# Patient Record
Sex: MALE | Race: WHITE | Employment: PART TIME | URBAN - METROPOLITAN AREA
[De-identification: names, ages, dates, MRNs, and addresses within clinical notes are randomized per-mention and may not be internally consistent; named-entity substitution may affect disease eponyms.]

---

## 2017-10-13 ENCOUNTER — APPOINTMENT (OUTPATIENT)
Dept: GENERAL RADIOLOGY | Age: 47
DRG: 603 | End: 2017-10-13
Attending: EMERGENCY MEDICINE
Payer: MEDICARE

## 2017-10-13 ENCOUNTER — HOSPITAL ENCOUNTER (INPATIENT)
Age: 47
LOS: 2 days | Discharge: HOME | DRG: 603 | End: 2017-10-15
Attending: EMERGENCY MEDICINE | Admitting: FAMILY MEDICINE
Payer: MEDICARE

## 2017-10-13 DIAGNOSIS — L03.116 CELLULITIS OF LEFT LEG: Primary | ICD-10-CM

## 2017-10-13 DIAGNOSIS — E66.01 MORBID OBESITY (HCC): ICD-10-CM

## 2017-10-13 PROBLEM — R73.9 HYPERGLYCEMIA: Status: ACTIVE | Noted: 2017-10-13

## 2017-10-13 PROBLEM — R11.2 NAUSEA AND VOMITING: Status: ACTIVE | Noted: 2017-10-13

## 2017-10-13 PROBLEM — E11.9 DM (DIABETES MELLITUS) (HCC): Status: ACTIVE | Noted: 2017-10-13

## 2017-10-13 LAB
ALBUMIN SERPL-MCNC: 3.8 G/DL (ref 3.4–5)
ALBUMIN/GLOB SERPL: 1 {RATIO} (ref 0.8–1.7)
ALP SERPL-CCNC: 141 U/L (ref 45–117)
ALT SERPL-CCNC: 36 U/L (ref 16–61)
ANION GAP SERPL CALC-SCNC: 11 MMOL/L (ref 3–18)
ANION GAP SERPL CALC-SCNC: 8 MMOL/L (ref 3–18)
APPEARANCE UR: CLEAR
AST SERPL-CCNC: 16 U/L (ref 15–37)
ATRIAL RATE: 90 BPM
BACTERIA URNS QL MICRO: ABNORMAL /HPF
BASOPHILS # BLD: 0 K/UL (ref 0–0.06)
BASOPHILS # BLD: 0 K/UL (ref 0–0.06)
BASOPHILS NFR BLD: 0 % (ref 0–2)
BASOPHILS NFR BLD: 0 % (ref 0–2)
BILIRUB SERPL-MCNC: 1.4 MG/DL (ref 0.2–1)
BILIRUB UR QL: NEGATIVE
BUN SERPL-MCNC: 12 MG/DL (ref 7–18)
BUN SERPL-MCNC: 13 MG/DL (ref 7–18)
BUN/CREAT SERPL: 11 (ref 12–20)
BUN/CREAT SERPL: 13 (ref 12–20)
CALCIUM SERPL-MCNC: 8.9 MG/DL (ref 8.5–10.1)
CALCIUM SERPL-MCNC: 9 MG/DL (ref 8.5–10.1)
CALCULATED P AXIS, ECG09: 50 DEGREES
CALCULATED R AXIS, ECG10: -9 DEGREES
CALCULATED T AXIS, ECG11: 34 DEGREES
CHLORIDE SERPL-SCNC: 100 MMOL/L (ref 100–108)
CHLORIDE SERPL-SCNC: 97 MMOL/L (ref 100–108)
CO2 SERPL-SCNC: 26 MMOL/L (ref 21–32)
CO2 SERPL-SCNC: 28 MMOL/L (ref 21–32)
COLOR UR: YELLOW
CREAT SERPL-MCNC: 1.04 MG/DL (ref 0.6–1.3)
CREAT SERPL-MCNC: 1.1 MG/DL (ref 0.6–1.3)
DIAGNOSIS, 93000: NORMAL
DIFFERENTIAL METHOD BLD: ABNORMAL
DIFFERENTIAL METHOD BLD: ABNORMAL
EOSINOPHIL # BLD: 0.5 K/UL (ref 0–0.4)
EOSINOPHIL # BLD: 0.7 K/UL (ref 0–0.4)
EOSINOPHIL NFR BLD: 2 % (ref 0–5)
EOSINOPHIL NFR BLD: 3 % (ref 0–5)
EPITH CASTS URNS QL MICRO: ABNORMAL /LPF (ref 0–5)
ERYTHROCYTE [DISTWIDTH] IN BLOOD BY AUTOMATED COUNT: 12.4 % (ref 11.6–14.5)
ERYTHROCYTE [DISTWIDTH] IN BLOOD BY AUTOMATED COUNT: 12.5 % (ref 11.6–14.5)
EST. AVERAGE GLUCOSE BLD GHB EST-MCNC: 306 MG/DL
GLOBULIN SER CALC-MCNC: 3.9 G/DL (ref 2–4)
GLUCOSE BLD STRIP.AUTO-MCNC: 255 MG/DL (ref 70–110)
GLUCOSE BLD STRIP.AUTO-MCNC: 261 MG/DL (ref 70–110)
GLUCOSE BLD STRIP.AUTO-MCNC: 265 MG/DL (ref 70–110)
GLUCOSE BLD STRIP.AUTO-MCNC: 273 MG/DL (ref 70–110)
GLUCOSE SERPL-MCNC: 286 MG/DL (ref 74–99)
GLUCOSE SERPL-MCNC: 355 MG/DL (ref 74–99)
GLUCOSE UR STRIP.AUTO-MCNC: >1000 MG/DL
HBA1C MFR BLD: 12.3 % (ref 4.2–5.6)
HCT VFR BLD AUTO: 44.2 % (ref 36–48)
HCT VFR BLD AUTO: 46.9 % (ref 36–48)
HGB BLD-MCNC: 15 G/DL (ref 13–16)
HGB BLD-MCNC: 16.2 G/DL (ref 13–16)
HGB UR QL STRIP: NEGATIVE
KETONES UR QL STRIP.AUTO: ABNORMAL MG/DL
LACTATE BLD-SCNC: 1.4 MMOL/L (ref 0.4–2)
LACTATE BLD-SCNC: 2.7 MMOL/L (ref 0.4–2)
LEUKOCYTE ESTERASE UR QL STRIP.AUTO: ABNORMAL
LIPASE SERPL-CCNC: 188 U/L (ref 73–393)
LYMPHOCYTES # BLD: 0.9 K/UL (ref 0.9–3.6)
LYMPHOCYTES # BLD: 1.4 K/UL (ref 0.9–3.6)
LYMPHOCYTES NFR BLD: 4 % (ref 21–52)
LYMPHOCYTES NFR BLD: 7 % (ref 21–52)
MCH RBC QN AUTO: 30.7 PG (ref 24–34)
MCH RBC QN AUTO: 30.9 PG (ref 24–34)
MCHC RBC AUTO-ENTMCNC: 33.9 G/DL (ref 31–37)
MCHC RBC AUTO-ENTMCNC: 34.5 G/DL (ref 31–37)
MCV RBC AUTO: 89.5 FL (ref 74–97)
MCV RBC AUTO: 90.6 FL (ref 74–97)
MONOCYTES # BLD: 1.5 K/UL (ref 0.05–1.2)
MONOCYTES # BLD: 1.5 K/UL (ref 0.05–1.2)
MONOCYTES NFR BLD: 7 % (ref 3–10)
MONOCYTES NFR BLD: 7 % (ref 3–10)
NEUTS SEG # BLD: 18.2 K/UL (ref 1.8–8)
NEUTS SEG # BLD: 19.3 K/UL (ref 1.8–8)
NEUTS SEG NFR BLD: 84 % (ref 40–73)
NEUTS SEG NFR BLD: 86 % (ref 40–73)
NITRITE UR QL STRIP.AUTO: NEGATIVE
P-R INTERVAL, ECG05: 170 MS
PH UR STRIP: 6.5 [PH] (ref 5–8)
PLATELET # BLD AUTO: 229 K/UL (ref 135–420)
PLATELET # BLD AUTO: 245 K/UL (ref 135–420)
PMV BLD AUTO: 10.2 FL (ref 9.2–11.8)
PMV BLD AUTO: 10.3 FL (ref 9.2–11.8)
POTASSIUM SERPL-SCNC: 4.2 MMOL/L (ref 3.5–5.5)
POTASSIUM SERPL-SCNC: 4.3 MMOL/L (ref 3.5–5.5)
PROT SERPL-MCNC: 7.7 G/DL (ref 6.4–8.2)
PROT UR STRIP-MCNC: 30 MG/DL
Q-T INTERVAL, ECG07: 370 MS
QRS DURATION, ECG06: 96 MS
QTC CALCULATION (BEZET), ECG08: 452 MS
RBC # BLD AUTO: 4.88 M/UL (ref 4.7–5.5)
RBC # BLD AUTO: 5.24 M/UL (ref 4.7–5.5)
RBC #/AREA URNS HPF: NEGATIVE /HPF (ref 0–5)
SODIUM SERPL-SCNC: 134 MMOL/L (ref 136–145)
SODIUM SERPL-SCNC: 136 MMOL/L (ref 136–145)
SP GR UR REFRACTOMETRY: >1.03 (ref 1–1.03)
UROBILINOGEN UR QL STRIP.AUTO: 1 EU/DL (ref 0.2–1)
VENTRICULAR RATE, ECG03: 90 BPM
WBC # BLD AUTO: 21.7 K/UL (ref 4.6–13.2)
WBC # BLD AUTO: 22.4 K/UL (ref 4.6–13.2)
WBC URNS QL MICRO: ABNORMAL /HPF (ref 0–4)
YEAST URNS QL MICRO: ABNORMAL

## 2017-10-13 PROCEDURE — 74000 XR ABD (KUB): CPT

## 2017-10-13 PROCEDURE — 96365 THER/PROPH/DIAG IV INF INIT: CPT

## 2017-10-13 PROCEDURE — 74011250636 HC RX REV CODE- 250/636: Performed by: FAMILY MEDICINE

## 2017-10-13 PROCEDURE — 36415 COLL VENOUS BLD VENIPUNCTURE: CPT | Performed by: FAMILY MEDICINE

## 2017-10-13 PROCEDURE — 99285 EMERGENCY DEPT VISIT HI MDM: CPT

## 2017-10-13 PROCEDURE — 65270000029 HC RM PRIVATE

## 2017-10-13 PROCEDURE — 71010 XR CHEST PORT: CPT

## 2017-10-13 PROCEDURE — 81001 URINALYSIS AUTO W/SCOPE: CPT | Performed by: EMERGENCY MEDICINE

## 2017-10-13 PROCEDURE — 74011636637 HC RX REV CODE- 636/637: Performed by: HOSPITALIST

## 2017-10-13 PROCEDURE — 74011250636 HC RX REV CODE- 250/636: Performed by: HOSPITALIST

## 2017-10-13 PROCEDURE — 83605 ASSAY OF LACTIC ACID: CPT

## 2017-10-13 PROCEDURE — 96361 HYDRATE IV INFUSION ADD-ON: CPT

## 2017-10-13 PROCEDURE — 80053 COMPREHEN METABOLIC PANEL: CPT | Performed by: EMERGENCY MEDICINE

## 2017-10-13 PROCEDURE — 96368 THER/DIAG CONCURRENT INF: CPT

## 2017-10-13 PROCEDURE — 83036 HEMOGLOBIN GLYCOSYLATED A1C: CPT | Performed by: FAMILY MEDICINE

## 2017-10-13 PROCEDURE — 77030020263 HC SOL INJ SOD CL0.9% LFCR 1000ML

## 2017-10-13 PROCEDURE — 93005 ELECTROCARDIOGRAM TRACING: CPT

## 2017-10-13 PROCEDURE — 80048 BASIC METABOLIC PNL TOTAL CA: CPT | Performed by: FAMILY MEDICINE

## 2017-10-13 PROCEDURE — 74011250637 HC RX REV CODE- 250/637: Performed by: FAMILY MEDICINE

## 2017-10-13 PROCEDURE — 96366 THER/PROPH/DIAG IV INF ADDON: CPT

## 2017-10-13 PROCEDURE — 82962 GLUCOSE BLOOD TEST: CPT

## 2017-10-13 PROCEDURE — 83690 ASSAY OF LIPASE: CPT | Performed by: EMERGENCY MEDICINE

## 2017-10-13 PROCEDURE — 96375 TX/PRO/DX INJ NEW DRUG ADDON: CPT

## 2017-10-13 PROCEDURE — 74011000258 HC RX REV CODE- 258: Performed by: EMERGENCY MEDICINE

## 2017-10-13 PROCEDURE — 87086 URINE CULTURE/COLONY COUNT: CPT | Performed by: EMERGENCY MEDICINE

## 2017-10-13 PROCEDURE — 74011250636 HC RX REV CODE- 250/636: Performed by: EMERGENCY MEDICINE

## 2017-10-13 PROCEDURE — 87040 BLOOD CULTURE FOR BACTERIA: CPT | Performed by: EMERGENCY MEDICINE

## 2017-10-13 PROCEDURE — 85025 COMPLETE CBC W/AUTO DIFF WBC: CPT | Performed by: EMERGENCY MEDICINE

## 2017-10-13 RX ORDER — ONDANSETRON 2 MG/ML
4 INJECTION INTRAMUSCULAR; INTRAVENOUS
Status: COMPLETED | OUTPATIENT
Start: 2017-10-13 | End: 2017-10-13

## 2017-10-13 RX ORDER — INSULIN LISPRO 100 [IU]/ML
INJECTION, SOLUTION INTRAVENOUS; SUBCUTANEOUS
Status: DISCONTINUED | OUTPATIENT
Start: 2017-10-13 | End: 2017-10-15 | Stop reason: HOSPADM

## 2017-10-13 RX ORDER — DEXTROSE 50 % IN WATER (D50W) INTRAVENOUS SYRINGE
25-50 AS NEEDED
Status: DISCONTINUED | OUTPATIENT
Start: 2017-10-13 | End: 2017-10-15 | Stop reason: HOSPADM

## 2017-10-13 RX ORDER — DIAZEPAM 10 MG/2ML
5 INJECTION INTRAMUSCULAR
Status: DISCONTINUED | OUTPATIENT
Start: 2017-10-13 | End: 2017-10-15 | Stop reason: HOSPADM

## 2017-10-13 RX ORDER — KETOROLAC TROMETHAMINE 30 MG/ML
30 INJECTION, SOLUTION INTRAMUSCULAR; INTRAVENOUS
Status: COMPLETED | OUTPATIENT
Start: 2017-10-13 | End: 2017-10-13

## 2017-10-13 RX ORDER — INSULIN LISPRO 100 [IU]/ML
INJECTION, SOLUTION INTRAVENOUS; SUBCUTANEOUS
Status: DISCONTINUED | OUTPATIENT
Start: 2017-10-13 | End: 2017-10-13

## 2017-10-13 RX ORDER — MORPHINE SULFATE 2 MG/ML
2 INJECTION, SOLUTION INTRAMUSCULAR; INTRAVENOUS ONCE
Status: ACTIVE | OUTPATIENT
Start: 2017-10-13 | End: 2017-10-13

## 2017-10-13 RX ORDER — INSULIN GLARGINE 100 [IU]/ML
46 INJECTION, SOLUTION SUBCUTANEOUS
COMMUNITY

## 2017-10-13 RX ORDER — FLUOXETINE HYDROCHLORIDE 20 MG/1
40 CAPSULE ORAL DAILY
Status: DISCONTINUED | OUTPATIENT
Start: 2017-10-13 | End: 2017-10-14

## 2017-10-13 RX ORDER — NEBIVOLOL 2.5 MG/1
10 TABLET ORAL DAILY
Status: DISCONTINUED | OUTPATIENT
Start: 2017-10-13 | End: 2017-10-14

## 2017-10-13 RX ORDER — NALOXONE HYDROCHLORIDE 0.4 MG/ML
0.4 INJECTION, SOLUTION INTRAMUSCULAR; INTRAVENOUS; SUBCUTANEOUS AS NEEDED
Status: DISCONTINUED | OUTPATIENT
Start: 2017-10-13 | End: 2017-10-15 | Stop reason: HOSPADM

## 2017-10-13 RX ORDER — DOCUSATE SODIUM 100 MG/1
100 CAPSULE, LIQUID FILLED ORAL 2 TIMES DAILY
Status: DISCONTINUED | OUTPATIENT
Start: 2017-10-13 | End: 2017-10-15 | Stop reason: HOSPADM

## 2017-10-13 RX ORDER — SODIUM CHLORIDE 9 MG/ML
100 INJECTION, SOLUTION INTRAVENOUS CONTINUOUS
Status: DISCONTINUED | OUTPATIENT
Start: 2017-10-13 | End: 2017-10-15 | Stop reason: HOSPADM

## 2017-10-13 RX ORDER — NEBIVOLOL 10 MG/1
10 TABLET ORAL DAILY
COMMUNITY

## 2017-10-13 RX ORDER — OXYCODONE AND ACETAMINOPHEN 5; 325 MG/1; MG/1
1 TABLET ORAL
Status: ON HOLD | COMMUNITY
End: 2017-10-15

## 2017-10-13 RX ORDER — ALBUTEROL SULFATE 0.83 MG/ML
2.5 SOLUTION RESPIRATORY (INHALATION)
Status: DISCONTINUED | OUTPATIENT
Start: 2017-10-13 | End: 2017-10-15 | Stop reason: HOSPADM

## 2017-10-13 RX ORDER — LISINOPRIL 40 MG/1
40 TABLET ORAL DAILY
Status: DISCONTINUED | OUTPATIENT
Start: 2017-10-13 | End: 2017-10-15 | Stop reason: HOSPADM

## 2017-10-13 RX ORDER — OXYCODONE AND ACETAMINOPHEN 5; 325 MG/1; MG/1
1 TABLET ORAL
Status: DISCONTINUED | OUTPATIENT
Start: 2017-10-13 | End: 2017-10-15 | Stop reason: HOSPADM

## 2017-10-13 RX ORDER — ALBUTEROL SULFATE 90 UG/1
2 AEROSOL, METERED RESPIRATORY (INHALATION)
Status: DISCONTINUED | OUTPATIENT
Start: 2017-10-13 | End: 2017-10-13 | Stop reason: CLARIF

## 2017-10-13 RX ORDER — VENLAFAXINE HYDROCHLORIDE 150 MG/1
150 CAPSULE, EXTENDED RELEASE ORAL DAILY
COMMUNITY

## 2017-10-13 RX ORDER — ONDANSETRON 2 MG/ML
4 INJECTION INTRAMUSCULAR; INTRAVENOUS
Status: DISCONTINUED | OUTPATIENT
Start: 2017-10-13 | End: 2017-10-15 | Stop reason: HOSPADM

## 2017-10-13 RX ORDER — MORPHINE SULFATE 2 MG/ML
2 INJECTION, SOLUTION INTRAMUSCULAR; INTRAVENOUS
Status: DISCONTINUED | OUTPATIENT
Start: 2017-10-13 | End: 2017-10-15 | Stop reason: HOSPADM

## 2017-10-13 RX ORDER — ENOXAPARIN SODIUM 100 MG/ML
40 INJECTION SUBCUTANEOUS EVERY 24 HOURS
Status: DISCONTINUED | OUTPATIENT
Start: 2017-10-13 | End: 2017-10-15 | Stop reason: HOSPADM

## 2017-10-13 RX ORDER — ALBUTEROL SULFATE 90 UG/1
AEROSOL, METERED RESPIRATORY (INHALATION)
COMMUNITY

## 2017-10-13 RX ORDER — ACETAMINOPHEN 325 MG/1
650 TABLET ORAL
Status: DISCONTINUED | OUTPATIENT
Start: 2017-10-13 | End: 2017-10-15 | Stop reason: HOSPADM

## 2017-10-13 RX ORDER — MAGNESIUM SULFATE 100 %
4 CRYSTALS MISCELLANEOUS AS NEEDED
Status: DISCONTINUED | OUTPATIENT
Start: 2017-10-13 | End: 2017-10-15 | Stop reason: HOSPADM

## 2017-10-13 RX ORDER — SODIUM CHLORIDE 0.9 % (FLUSH) 0.9 %
5-10 SYRINGE (ML) INJECTION AS NEEDED
Status: DISCONTINUED | OUTPATIENT
Start: 2017-10-13 | End: 2017-10-15 | Stop reason: HOSPADM

## 2017-10-13 RX ADMIN — ONDANSETRON 4 MG: 2 INJECTION INTRAMUSCULAR; INTRAVENOUS at 01:45

## 2017-10-13 RX ADMIN — INSULIN LISPRO 9 UNITS: 100 INJECTION, SOLUTION INTRAVENOUS; SUBCUTANEOUS at 22:14

## 2017-10-13 RX ADMIN — LISINOPRIL 40 MG: 40 TABLET ORAL at 09:40

## 2017-10-13 RX ADMIN — INSULIN LISPRO 9 UNITS: 100 INJECTION, SOLUTION INTRAVENOUS; SUBCUTANEOUS at 09:42

## 2017-10-13 RX ADMIN — INSULIN DETEMIR 23 UNITS: 100 INJECTION, SOLUTION SUBCUTANEOUS at 09:42

## 2017-10-13 RX ADMIN — ENOXAPARIN SODIUM 40 MG: 40 INJECTION SUBCUTANEOUS at 09:38

## 2017-10-13 RX ADMIN — SODIUM CHLORIDE 2000 MG: 900 INJECTION, SOLUTION INTRAVENOUS at 16:37

## 2017-10-13 RX ADMIN — SODIUM CHLORIDE 1000 ML: 900 INJECTION, SOLUTION INTRAVENOUS at 02:18

## 2017-10-13 RX ADMIN — SODIUM CHLORIDE 2000 MG: 900 INJECTION, SOLUTION INTRAVENOUS at 23:52

## 2017-10-13 RX ADMIN — MORPHINE SULFATE 2 MG: 2 INJECTION, SOLUTION INTRAMUSCULAR; INTRAVENOUS at 09:39

## 2017-10-13 RX ADMIN — SODIUM CHLORIDE 1000 ML: 900 INJECTION, SOLUTION INTRAVENOUS at 01:45

## 2017-10-13 RX ADMIN — MORPHINE SULFATE 2 MG: 2 INJECTION, SOLUTION INTRAMUSCULAR; INTRAVENOUS at 15:25

## 2017-10-13 RX ADMIN — SODIUM CHLORIDE 1000 MG: 900 INJECTION, SOLUTION INTRAVENOUS at 03:57

## 2017-10-13 RX ADMIN — SODIUM CHLORIDE 1000 MG: 900 INJECTION, SOLUTION INTRAVENOUS at 09:38

## 2017-10-13 RX ADMIN — INSULIN LISPRO 9 UNITS: 100 INJECTION, SOLUTION INTRAVENOUS; SUBCUTANEOUS at 13:07

## 2017-10-13 RX ADMIN — SODIUM CHLORIDE, SODIUM LACTATE, POTASSIUM CHLORIDE, AND CALCIUM CHLORIDE 1000 ML: 600; 310; 30; 20 INJECTION, SOLUTION INTRAVENOUS at 04:03

## 2017-10-13 RX ADMIN — KETOROLAC TROMETHAMINE 30 MG: 30 INJECTION, SOLUTION INTRAMUSCULAR at 01:45

## 2017-10-13 RX ADMIN — CEFTRIAXONE 2 G: 2 INJECTION, POWDER, FOR SOLUTION INTRAMUSCULAR; INTRAVENOUS at 03:57

## 2017-10-13 RX ADMIN — SODIUM CHLORIDE 100 ML/HR: 900 INJECTION, SOLUTION INTRAVENOUS at 07:48

## 2017-10-13 RX ADMIN — INSULIN DETEMIR 23 UNITS: 100 INJECTION, SOLUTION SUBCUTANEOUS at 22:16

## 2017-10-13 RX ADMIN — INSULIN LISPRO 9 UNITS: 100 INJECTION, SOLUTION INTRAVENOUS; SUBCUTANEOUS at 17:59

## 2017-10-13 NOTE — ED PROVIDER NOTES
HPI Comments: Jovita Whittaker III is a 41 y.o. Male with h/o iddm, htn, morbid obesity with c/o not feeling well for day with bilateral back pain, neck pain, nausea. Sx worse with movement. No fall, recent urinary sx, admissions, abx. Some pain in upper legs but has no redness. Chronic lymphedema and skin changes in lower legs that is not changed. No cough, sig sob, abd pain. No abd issues in the past. H/o uti in the past, admission for sepsis    The history is provided by the patient and medical records. No past medical history on file. No past surgical history on file. No family history on file. Social History     Social History    Marital status: SINGLE     Spouse name: N/A    Number of children: N/A    Years of education: N/A     Occupational History    Not on file. Social History Main Topics    Smoking status: Former Smoker     Quit date: 1/1/1996    Smokeless tobacco: Never Used    Alcohol use Not on file    Drug use: Not on file    Sexual activity: Not on file     Other Topics Concern    Not on file     Social History Narrative    No narrative on file         ALLERGIES: Demerol [meperidine]    Review of Systems   Constitutional: Positive for appetite change. Negative for fever. HENT: Negative for sore throat and trouble swallowing. Eyes: Negative for visual disturbance. Respiratory: Negative for cough and shortness of breath. Cardiovascular: Positive for leg swelling. Negative for chest pain. Gastrointestinal: Negative for abdominal pain. Genitourinary: Positive for flank pain. Negative for discharge. Musculoskeletal: Positive for arthralgias, back pain and neck pain. Skin: Positive for rash. Allergic/Immunologic: Negative for immunocompromised state. Neurological: Negative for syncope. Psychiatric/Behavioral: Positive for sleep disturbance.        Vitals:    10/13/17 0132 10/13/17 0200 10/13/17 0230 10/13/17 0252   BP:  149/67 146/59    Pulse: 86 79 81    Resp: 17 11 20    Temp:    98.8 °F (37.1 °C)   SpO2: 97% 97% 92%    Weight:       Height:                Physical Exam   Constitutional: He is oriented to person, place, and time. Non-toxic appearance. He does not appear ill. No distress. HENT:   Head: Normocephalic and atraumatic. Right Ear: External ear normal.   Left Ear: External ear normal.   Nose: Nose normal.   Mouth/Throat: Oropharynx is clear and moist. No oropharyngeal exudate. Eyes: Conjunctivae are normal.   Neck: Normal range of motion. Neck supple. Muscular tenderness present. No spinous process tenderness present. No rigidity. No edema, no erythema and normal range of motion present. Cardiovascular: Normal rate, regular rhythm, normal heart sounds and intact distal pulses. Pulmonary/Chest: Effort normal and breath sounds normal. No respiratory distress. Abdominal: Soft. He exhibits no distension and no mass. There is no hepatosplenomegaly. There is tenderness. There is CVA tenderness. There is no rebound and no guarding. Musculoskeletal: Normal range of motion. He exhibits edema. Arms:       Legs:  Neurological: He is alert and oriented to person, place, and time. Skin: Skin is warm and dry. Rash (chronic skin thickening to lower ext. no sig erythema) noted. He is not diaphoretic. Psychiatric: His behavior is normal.   Nursing note and vitals reviewed.        [facility]  ED Course       Procedures    Vitals:  Patient Vitals for the past 12 hrs:   Temp Pulse Resp BP SpO2   10/13/17 0252 98.8 °F (37.1 °C) - - - -   10/13/17 0230 - 81 20 146/59 92 %   10/13/17 0200 - 79 11 149/67 97 %   10/13/17 0132 - 86 17 - 97 %         Medications ordered:   Medications   sodium chloride (NS) flush 5-10 mL (not administered)   cefTRIAXone (ROCEPHIN) 2 g in 0.9% sodium chloride (MBP/ADV) 50 mL MBP (2 g IntraVENous New Bag 10/13/17 5298)   vancomycin (VANCOCIN) 1,000 mg in 0.9% sodium chloride (MBP/ADV) 250 mL adv (1,000 mg IntraVENous New Bag 10/13/17 0357)   VANCOMYCIN INFORMATION NOTE (not administered)   sodium chloride 0.9 % bolus infusion 1,000 mL (0 mL IntraVENous IV Completed 10/13/17 0258)   ketorolac (TORADOL) injection 30 mg (30 mg IntraVENous Given 10/13/17 0145)   ondansetron (ZOFRAN) injection 4 mg (4 mg IntraVENous Given 10/13/17 0145)   sodium chloride 0.9 % bolus infusion 1,000 mL (1,000 mL IntraVENous New Bag 10/13/17 0218)   lactated ringers bolus infusion 1,000 mL (1,000 mL IntraVENous New Bag 10/13/17 0403)         Lab findings:  Recent Results (from the past 12 hour(s))   CBC WITH AUTOMATED DIFF    Collection Time: 10/13/17  1:43 AM   Result Value Ref Range    WBC 22.4 (H) 4.6 - 13.2 K/uL    RBC 5.24 4.70 - 5.50 M/uL    HGB 16.2 (H) 13.0 - 16.0 g/dL    HCT 46.9 36.0 - 48.0 %    MCV 89.5 74.0 - 97.0 FL    MCH 30.9 24.0 - 34.0 PG    MCHC 34.5 31.0 - 37.0 g/dL    RDW 12.4 11.6 - 14.5 %    PLATELET 886 210 - 308 K/uL    MPV 10.3 9.2 - 11.8 FL    NEUTROPHILS 86 (H) 40 - 73 %    LYMPHOCYTES 4 (L) 21 - 52 %    MONOCYTES 7 3 - 10 %    EOSINOPHILS 3 0 - 5 %    BASOPHILS 0 0 - 2 %    ABS. NEUTROPHILS 19.3 (H) 1.8 - 8.0 K/UL    ABS. LYMPHOCYTES 0.9 0.9 - 3.6 K/UL    ABS. MONOCYTES 1.5 (H) 0.05 - 1.2 K/UL    ABS. EOSINOPHILS 0.7 (H) 0.0 - 0.4 K/UL    ABS. BASOPHILS 0.0 0.0 - 0.06 K/UL    DF AUTOMATED     METABOLIC PANEL, COMPREHENSIVE    Collection Time: 10/13/17  1:43 AM   Result Value Ref Range    Sodium 134 (L) 136 - 145 mmol/L    Potassium 4.2 3.5 - 5.5 mmol/L    Chloride 97 (L) 100 - 108 mmol/L    CO2 26 21 - 32 mmol/L    Anion gap 11 3.0 - 18 mmol/L    Glucose 355 (H) 74 - 99 mg/dL    BUN 12 7.0 - 18 MG/DL    Creatinine 1.10 0.6 - 1.3 MG/DL    BUN/Creatinine ratio 11 (L) 12 - 20      GFR est AA >60 >60 ml/min/1.73m2    GFR est non-AA >60 >60 ml/min/1.73m2    Calcium 8.9 8.5 - 10.1 MG/DL    Bilirubin, total 1.4 (H) 0.2 - 1.0 MG/DL    ALT (SGPT) 36 16 - 61 U/L    AST (SGOT) 16 15 - 37 U/L    Alk.  phosphatase 141 (H) 45 - 117 U/L Protein, total 7.7 6.4 - 8.2 g/dL    Albumin 3.8 3.4 - 5.0 g/dL    Globulin 3.9 2.0 - 4.0 g/dL    A-G Ratio 1.0 0.8 - 1.7     LIPASE    Collection Time: 10/13/17  1:43 AM   Result Value Ref Range    Lipase 188 73 - 393 U/L   URINALYSIS W/ RFLX MICROSCOPIC    Collection Time: 10/13/17  3:15 AM   Result Value Ref Range    Color YELLOW      Appearance CLEAR      Specific gravity >1.030 (H) 1.005 - 1.030    pH (UA) 6.5 5.0 - 8.0      Protein 30 (A) NEG mg/dL    Glucose >1000 (A) NEG mg/dL    Ketone TRACE (A) NEG mg/dL    Bilirubin NEGATIVE  NEG      Blood NEGATIVE  NEG      Urobilinogen 1.0 0.2 - 1.0 EU/dL    Nitrites NEGATIVE  NEG      Leukocyte Esterase TRACE (A) NEG     POC LACTIC ACID    Collection Time: 10/13/17  3:16 AM   Result Value Ref Range    Lactic Acid (POC) 2.7 (HH) 0.4 - 2.0 mmol/L   EKG, 12 LEAD, INITIAL    Collection Time: 10/13/17  3:47 AM   Result Value Ref Range    Ventricular Rate 90 BPM    Atrial Rate 90 BPM    P-R Interval 170 ms    QRS Duration 96 ms    Q-T Interval 370 ms    QTC Calculation (Bezet) 452 ms    Calculated P Axis 50 degrees    Calculated R Axis -9 degrees    Calculated T Axis 34 degrees    Diagnosis       Normal sinus rhythm  Possible Anterior infarct , age undetermined  Abnormal ECG  No previous ECGs available         EKG interpretation by ED Physician:      X-Ray, CT or other radiology findings or impressions:  XR ABD (KUB)    (Results Pending)   XR CHEST PORT    (Results Pending)   nothing acute on xrays per my interp    Progress notes, Consult notes or additional Procedure notes:   Cellulitis as most likely source. Doubt sepsis, with no tachycardia, hypotension. Will recheck lactate again after fluids  Nl distal perfusion  D/w pt need for admission  D/w Dr Celso Thomason who will admit    Reevaluation of patient:   Stable for admission    Disposition:  Diagnosis:   1. Cellulitis of left leg    2. Morbid obesity ([address]    3.  IDDM (insulin dependent diabetes mellitus) ([address] Disposition: admit    Follow-up Information     None            Patient's Medications   Start Taking    No medications on file   Continue Taking    ALBUTEROL (PROAIR HFA) 90 MCG/ACTUATION INHALER    Take  by inhalation. EZETIMIBE-SIMVASTATIN (VYTORIN 10/20) 10-20 MG PER TABLET    Take 1 Tab by mouth nightly. FLUOXETINE (PROZAC) 40 MG CAPSULE    Take 40 mg by mouth daily. INSULIN GLARGINE (LANTUS SOLOSTAR) 100 UNIT/ML (3 ML) INPN    46 Units by SubCUTAneous route. LISINOPRIL (PRINIVIL, ZESTRIL) 40 MG TABLET    Take 40 mg by mouth daily. MULTIVITAMIN PO    Take  by mouth. NEBIVOLOL (BYSTOLIC) 10 MG TABLET    Take 10 mg by mouth daily. OLMESARTAN-HYDROCHLOROTHIAZIDE (BENICAR HCT) 40-25 MG PER TABLET    Take 1 Tab by mouth daily. TRAMADOL (ULTRAM) 50 MG TABLET    Take 50 mg by mouth every six (6) hours as needed.    These Medications have changed    No medications on file   Stop Taking    No medications on file

## 2017-10-13 NOTE — PROGRESS NOTES
4611: Received patient from the ED. Dual skin assessment with IRVIN Leonardo. Bed in lowest position and locked, with call light within reach. 9111: Bedside and Verbal shift change report given to IRVIN Laughlin (oncoming nurse) by Homer/IRVIN Leonardo (offgoing nurse). Report included the following information SBAR, Kardex, Intake/Output, MAR and Recent Results.

## 2017-10-13 NOTE — Clinical Note
Status[de-identified] Inpatient [101] Type of Bed: Medical [8] Inpatient Hospitalization Certified Necessary for the Following Reasons: 3. Patient receiving treatment that can only be provided in an inpatient setting (further clarification in H&P documentation) Admitting Diagnosis: Cellulitis of leg, left [[id number]] Admitting Physician: Bony Muñiz Attending Physician: Bony Muñiz Estimated Length of Stay: 2 Midnights Discharge Plan[de-identified] Home with Office Follow-up

## 2017-10-13 NOTE — IP AVS SNAPSHOT
[address] [address] 
[phone] Patient: Omari Mcintosh 
MRN: [id number] Maylon Low Current Discharge Medication List  
  
START taking these medications Dose & Instructions Dispensing Information Comments Morning Noon Evening Bedtime  
 trimethoprim-sulfamethoxazole 160-800 mg per tablet Commonly known as:  BACTRIM DS Your last dose was: Your next dose is:    
   
   
 Dose:  1 Tab Take 1 Tab by mouth two (2) times a day. Quantity:  20 Tab Refills:  0 CONTINUE these medications which have NOT CHANGED Dose & Instructions Dispensing Information Comments Morning Noon Evening Bedtime BENICAR HCT 40-25 mg per tablet Generic drug:  olmesartan-hydroCHLOROthiazide Your last dose was: Your next dose is:    
   
   
 Dose:  1 Tab Take 1 Tab by mouth daily. Refills:  0  
     
   
   
   
  
 BYSTOLIC 10 mg tablet Generic drug:  nebivolol Your last dose was: Your next dose is:    
   
   
 Dose:  10 mg Take 10 mg by mouth daily. Refills:  0  
     
   
   
   
  
 EFFEXOR  mg capsule Generic drug:  venlafaxine-SR Your last dose was: Your next dose is:    
   
   
 Dose:  150 mg Take 150 mg by mouth daily. Refills:  0  
     
   
   
   
  
 LANTUS SOLOSTAR 100 unit/mL (3 mL) Inpn Generic drug:  insulin glargine Your last dose was: Your next dose is:    
   
   
 Dose:  46 Units 46 Units by SubCUTAneous route. Refills:  0 MULTIVITAMIN PO Your last dose was: Your next dose is: Take  by mouth. Refills:  0  
     
   
   
   
  
 oxyCODONE-acetaminophen 5-325 mg per tablet Commonly known as:  PERCOCET Your last dose was: Your next dose is:    
   
   
 Dose:  1 Tab Take 1 Tab by mouth every four (4) hours as needed for Pain. Max Daily Amount: 6 Tabs. Quantity:  6 Tab Refills:  0 PROAIR HFA 90 mcg/actuation inhaler Generic drug:  albuterol Your last dose was: Your next dose is: Take  by inhalation. Refills:  0 PROzac 40 mg capsule Generic drug:  FLUoxetine Your last dose was: Your next dose is:    
   
   
 Dose:  40 mg Take 40 mg by mouth daily. Refills:  0 Vytorin 10/20 10-20 mg per tablet Generic drug:  ezetimibe-simvastatin Your last dose was: Your next dose is:    
   
   
 Dose:  1 Tab Take 1 Tab by mouth nightly. Refills:  0 STOP taking these medications   
 lisinopril 40 mg tablet Commonly known as:  PRINIVIL, ZESTRIL  
   
  
 traMADol 50 mg tablet Commonly known as:  ULTRAM  
   
  
  
  
Where to Get Your Medications Information on where to get these meds will be given to you by the nurse or doctor. ! Ask your nurse or doctor about these medications  
  oxyCODONE-acetaminophen 5-325 mg per tablet  
 trimethoprim-sulfamethoxazole 160-800 mg per tablet

## 2017-10-13 NOTE — PROGRESS NOTES
Pharmacy Dosing Services: Vancomycin    Indication: skin and soft tissue infection    Day of therapy: 0    Other Antimicrobials (Include dose, start day & day of therapy):  Rocephin 2g      Loading dose (date given): 1g + 1g  Current Maintenance dose: new start    Goal Vancomycin Level: 15-20  (Trough 15-20 for most infections, 20 for meningitis/osteomyelitis, pre-HD level ~25)    Vancomycin Level (if drawn): none     Significant Cultures:   10/13: blood and urine culture pending    Renal function stable? (unstable defined as SCr increase of 0.5 mg/dL or > 50% increase from baseline, whichever is greater) (Y/N): Y     CAPD, Hemodialysis or Renal Replacement Therapy (Y/N): N     Recent Labs      10/13/17   0143   CREA  1.10   BUN  12   WBC  22.4*     Temp (24hrs), Av.8 °F (37.1 °C), Min:98.8 °F (37.1 °C), Max:98.8 °F (37.1 °C)    Creatinine Clearance (Creatinine Clearance (ml/min)): >100 ml/min     Regimen assessment: Load total 2g  Maintenance dose: 2g IV q8h  Next scheduled level: 10/14 @ 1500       Pharmacy will follow daily and adjust medications as appropriate for renal function and/or serum levels.     Thank you,  Kyler Davis

## 2017-10-13 NOTE — ROUTINE PROCESS
1930 Bedside and Verbal shift change report given to yo RN (oncoming nurse) by Griselda Meredith RN   (offgoing nurse). Report included the following information Kardex, MAR and Recent Results.

## 2017-10-13 NOTE — H&P
History and Physical    Patient: Dylon Orantes III               Sex: male          DOA: 10/13/2017       YOB: 1970      Age:  41 y.o.        LOS:  LOS: 0 days        No chief complaint on file. HPI:     Dylon Orantes III is a 41 y.o. male who presents with nausea and vomiting for 3 days. He also c/o bilateral flank pain and neck pain. No fever , no chills. He denies abdominal pain and dysuria. Patient also has left leg pain and redness. He thinks it is his cellulitis. His blood sugar was 355 with A1c 12. 3. He also had lactic acidemia 2.7 The ED started him on IV rocephin and Vancomycin. He will be admitted for tx of his cellulitis and blood glucose control. Past Medical History:   Diagnosis Date    Diabetes ([address]     Hypercholesteremia     Hypertension    . History reviewed. No pertinent surgical history. No current facility-administered medications on file prior to encounter. Current Outpatient Prescriptions on File Prior to Encounter   Medication Sig Dispense Refill    olmesartan-hydrochlorothiazide (BENICAR HCT) 40-25 mg per tablet Take 1 Tab by mouth daily.  lisinopril (PRINIVIL, ZESTRIL) 40 mg tablet Take 40 mg by mouth daily.  tramadol (ULTRAM) 50 mg tablet Take 50 mg by mouth every six (6) hours as needed.  fluoxetine (PROZAC) 40 mg capsule Take 40 mg by mouth daily.  MULTIVITAMIN PO Take  by mouth.  ezetimibe-simvastatin (VYTORIN 10/20) 10-20 mg per tablet Take 1 Tab by mouth nightly. Social History     Social History    Marital status: SINGLE     Spouse name: N/A    Number of children: N/A    Years of education: N/A     Occupational History    Not on file.      Social History Main Topics    Smoking status: Former Smoker     Quit date: 1/1/1996    Smokeless tobacco: Never Used    Alcohol use No    Drug use: No    Sexual activity: Not on file     Other Topics Concern    Not on file     Social History Narrative Prior to Admission Medications   Prescriptions Last Dose Informant Patient Reported? Taking? MULTIVITAMIN PO   Yes Yes   Sig: Take  by mouth. albuterol (PROAIR HFA) 90 mcg/actuation inhaler   Yes Yes   Sig: Take  by inhalation. ezetimibe-simvastatin (VYTORIN 10/20) 10-20 mg per tablet   Yes No   Sig: Take 1 Tab by mouth nightly. fluoxetine (PROZAC) 40 mg capsule   Yes Yes   Sig: Take 40 mg by mouth daily. insulin glargine (LANTUS SOLOSTAR) 100 unit/mL (3 mL) inpn   Yes Yes   Si Units by SubCUTAneous route. lisinopril (PRINIVIL, ZESTRIL) 40 mg tablet   Yes Yes   Sig: Take 40 mg by mouth daily. nebivolol (BYSTOLIC) 10 mg tablet   Yes Yes   Sig: Take 10 mg by mouth daily. olmesartan-hydrochlorothiazide (BENICAR HCT) 40-25 mg per tablet   Yes Yes   Sig: Take 1 Tab by mouth daily. tramadol (ULTRAM) 50 mg tablet   Yes No   Sig: Take 50 mg by mouth every six (6) hours as needed. Facility-Administered Medications: None       History reviewed. No pertinent family history. Allergies   Allergen Reactions    Demerol [Meperidine] Anaphylaxis       Review of Systems   Constitutional: Negative. HENT: Negative. Eyes: Negative. Respiratory: Negative. Cardiovascular: Negative. Gastrointestinal: Positive for nausea and vomiting. Negative for abdominal pain. Genitourinary: Positive for flank pain. Musculoskeletal: Positive for back pain. Skin:        Redness L thigh   Neurological: Negative. Psychiatric/Behavioral: Negative. Physical Exam:       Visit Vitals    /57    Pulse 82    Temp 98.8 °F (37.1 °C)    Resp 14    Ht 6' 4\" (1.93 m)    Wt (!) 258.6 kg (570 lb)    SpO2 99%    BMI 69.38 kg/m2     Physical Exam   Constitutional: He is oriented to person, place, and time. He appears well-developed and well-nourished. No distress. OBESE   HENT:   Head: Normocephalic and atraumatic. Eyes: Conjunctivae are normal. No scleral icterus.    Neck: Normal range of motion. Neck supple. Cardiovascular: Normal rate, regular rhythm and normal heart sounds. No murmur heard. Pulmonary/Chest: Effort normal and breath sounds normal. No respiratory distress. Abdominal: Soft. Bowel sounds are normal. He exhibits no distension. There is no tenderness. There is CVA tenderness. There is no guarding. Neurological: He is alert and oriented to person, place, and time. Skin: Skin is warm. No rash noted. He is not diaphoretic. There is erythema. No pallor. Psychiatric: He has a normal mood and affect. Ancillary Studies: All lab and imaging reviewed for the past 24 hours. Recent Results (from the past 24 hour(s))   CBC WITH AUTOMATED DIFF    Collection Time: 10/13/17  1:43 AM   Result Value Ref Range    WBC 22.4 (H) 4.6 - 13.2 K/uL    RBC 5.24 4.70 - 5.50 M/uL    HGB 16.2 (H) 13.0 - 16.0 g/dL    HCT 46.9 36.0 - 48.0 %    MCV 89.5 74.0 - 97.0 FL    MCH 30.9 24.0 - 34.0 PG    MCHC 34.5 31.0 - 37.0 g/dL    RDW 12.4 11.6 - 14.5 %    PLATELET 273 888 - 058 K/uL    MPV 10.3 9.2 - 11.8 FL    NEUTROPHILS 86 (H) 40 - 73 %    LYMPHOCYTES 4 (L) 21 - 52 %    MONOCYTES 7 3 - 10 %    EOSINOPHILS 3 0 - 5 %    BASOPHILS 0 0 - 2 %    ABS. NEUTROPHILS 19.3 (H) 1.8 - 8.0 K/UL    ABS. LYMPHOCYTES 0.9 0.9 - 3.6 K/UL    ABS. MONOCYTES 1.5 (H) 0.05 - 1.2 K/UL    ABS. EOSINOPHILS 0.7 (H) 0.0 - 0.4 K/UL    ABS.  BASOPHILS 0.0 0.0 - 0.06 K/UL    DF AUTOMATED     METABOLIC PANEL, COMPREHENSIVE    Collection Time: 10/13/17  1:43 AM   Result Value Ref Range    Sodium 134 (L) 136 - 145 mmol/L    Potassium 4.2 3.5 - 5.5 mmol/L    Chloride 97 (L) 100 - 108 mmol/L    CO2 26 21 - 32 mmol/L    Anion gap 11 3.0 - 18 mmol/L    Glucose 355 (H) 74 - 99 mg/dL    BUN 12 7.0 - 18 MG/DL    Creatinine 1.10 0.6 - 1.3 MG/DL    BUN/Creatinine ratio 11 (L) 12 - 20      GFR est AA >60 >60 ml/min/1.73m2    GFR est non-AA >60 >60 ml/min/1.73m2    Calcium 8.9 8.5 - 10.1 MG/DL    Bilirubin, total 1.4 (H) 0.2 - 1.0 MG/DL    ALT (SGPT) 36 16 - 61 U/L    AST (SGOT) 16 15 - 37 U/L    Alk.  phosphatase 141 (H) 45 - 117 U/L    Protein, total 7.7 6.4 - 8.2 g/dL    Albumin 3.8 3.4 - 5.0 g/dL    Globulin 3.9 2.0 - 4.0 g/dL    A-G Ratio 1.0 0.8 - 1.7     LIPASE    Collection Time: 10/13/17  1:43 AM   Result Value Ref Range    Lipase 188 73 - 393 U/L   HEMOGLOBIN A1C WITH EAG    Collection Time: 10/13/17  1:43 AM   Result Value Ref Range    Hemoglobin A1c 12.3 (H) 4.2 - 5.6 %    Est. average glucose 306 mg/dL   URINALYSIS W/ RFLX MICROSCOPIC    Collection Time: 10/13/17  3:15 AM   Result Value Ref Range    Color YELLOW      Appearance CLEAR      Specific gravity >1.030 (H) 1.005 - 1.030    pH (UA) 6.5 5.0 - 8.0      Protein 30 (A) NEG mg/dL    Glucose >1000 (A) NEG mg/dL    Ketone TRACE (A) NEG mg/dL    Bilirubin NEGATIVE  NEG      Blood NEGATIVE  NEG      Urobilinogen 1.0 0.2 - 1.0 EU/dL    Nitrites NEGATIVE  NEG      Leukocyte Esterase TRACE (A) NEG     URINE MICROSCOPIC ONLY    Collection Time: 10/13/17  3:15 AM   Result Value Ref Range    WBC 4 to 10 0 - 4 /hpf    RBC NEGATIVE  0 - 5 /hpf    Epithelial cells 2+ 0 - 5 /lpf    Bacteria FEW (A) NEG /hpf    Yeast w/hyphae FEW NEG   POC LACTIC ACID    Collection Time: 10/13/17  3:16 AM   Result Value Ref Range    Lactic Acid (POC) 2.7 (HH) 0.4 - 2.0 mmol/L   EKG, 12 LEAD, INITIAL    Collection Time: 10/13/17  3:47 AM   Result Value Ref Range    Ventricular Rate 90 BPM    Atrial Rate 90 BPM    P-R Interval 170 ms    QRS Duration 96 ms    Q-T Interval 370 ms    QTC Calculation (Bezet) 452 ms    Calculated P Axis 50 degrees    Calculated R Axis -9 degrees    Calculated T Axis 34 degrees    Diagnosis       Normal sinus rhythm  Possible Anterior infarct , age undetermined  Abnormal ECG  No previous ECGs available     POC LACTIC ACID    Collection Time: 10/13/17  5:13 AM   Result Value Ref Range    Lactic Acid (POC) 1.4 0.4 - 2.0 mmol/L       Assessment/Plan     Principal Problem:    Cellulitis of leg, left (10/13/2017)    Active Problems: Morbid obesity with BMI of 60.0-69.9, adult ([address] (10/13/2017)      DM (diabetes mellitus) ([address] (10/13/2017)      Hyperglycemia (10/13/2017)      Nausea and vomiting (10/13/2017)        Depression  Chronic back pain  HTN  DM    PLAN:    Cellulitis Left leg - On Vancomycin and Rocephin. Pain control as needed. Follow blood culture     Morbid obesity - advise life style changes, diet exercise. May be a candidate for bariatric surgery- BMI1 69. Recommend referral  to bariatric surgery outpatient on discharge    DM - poor control. A1c 12.3. Blood glucose 355.  Continue Lantus and SSI    Nausea + Vomiting - Antiemetic as needed    Depression - Prozac    Chronic pain - neck, back - Valium for muscle relaxants and perocet prn home dose    HTN - Home med    DVT Prophylaxis     Full code         Nicole Patel MD  10/13/2017  4:41 AM

## 2017-10-13 NOTE — IP AVS SNAPSHOT
Yi Ramsay 
 
 
 [address] [place] [address] 
[phone] Patient: Neel Segura 
MRN: [id number] Collette Naas You are allergic to the following Allergen Reactions Demerol (Meperidine) Anaphylaxis Immunizations Administered for This Admission Name Date Influenza Vaccine (Quad) PF 10/14/2017 Recent Documentation Height Weight BMI Smoking Status 1.93 m (!) 258.6 kg 69.38 kg/m2 Former Smoker Unresulted Labs Order Current Status CULTURE, BLOOD Preliminary result CULTURE, BLOOD Preliminary result Emergency Contacts Name Discharge Info Relation Home Work Mobile OscarRaf michele DECLINED CAREGIVER [4] Other Relative [6] [phone] About your hospitalization You were admitted on:  October 13, 2017 You last received care in the:  [address] [facility]UR[facility] You were discharged on:  October 15, 2017 Unit phone number:  [phone] Why you were hospitalized Your primary diagnosis was:  Cellulitis Of Leg, Left Your diagnoses also included: Morbid Obesity With Bmi Of 60.0-69.9, Adult (Hcc), Dm (Diabetes Mellitus) (Hcc), Hyperglycemia, Nausea And Vomiting Providers Seen During Your Hospitalizations Provider Role Specialty Primary office phone Mati Min MD Attending Provider Emergency Medicine [phone] Livier Smyth MD Attending Provider General acute hospital [phone] Lilliana Hickman DO Attending Provider Internal Medicine [phone] Fran Rose MD Attending Provider General acute hospital [phone] Tia Faria MD Attending Provider Internal Medicine [phone] Your Primary Care Physician (PCP) Primary Care Physician Office Phone Office Fax Paresh Lainez [phone] Follow-up Information Follow up With Details Comments Contact Info Jennie Bourgeois DO In 1 week to follow up with hospitalization  [address] [facility] [phone] Current Discharge Medication List  
  
START taking these medications Dose & Instructions Dispensing Information Comments Morning Noon Evening Bedtime  
 trimethoprim-sulfamethoxazole 160-800 mg per tablet Commonly known as:  BACTRIM DS Your last dose was: Your next dose is:    
   
   
 Dose:  1 Tab Take 1 Tab by mouth two (2) times a day. Quantity:  20 Tab Refills:  0 CONTINUE these medications which have NOT CHANGED Dose & Instructions Dispensing Information Comments Morning Noon Evening Bedtime BENICAR HCT 40-25 mg per tablet Generic drug:  olmesartan-hydroCHLOROthiazide Your last dose was: Your next dose is:    
   
   
 Dose:  1 Tab Take 1 Tab by mouth daily. Refills:  0  
     
   
   
   
  
 BYSTOLIC 10 mg tablet Generic drug:  nebivolol Your last dose was: Your next dose is:    
   
   
 Dose:  10 mg Take 10 mg by mouth daily. Refills:  0  
     
   
   
   
  
 EFFEXOR  mg capsule Generic drug:  venlafaxine-SR Your last dose was: Your next dose is:    
   
   
 Dose:  150 mg Take 150 mg by mouth daily. Refills:  0  
     
   
   
   
  
 LANTUS SOLOSTAR 100 unit/mL (3 mL) Inpn Generic drug:  insulin glargine Your last dose was: Your next dose is:    
   
   
 Dose:  46 Units 46 Units by SubCUTAneous route. Refills:  0 MULTIVITAMIN PO Your last dose was: Your next dose is: Take  by mouth. Refills:  0  
     
   
   
   
  
 oxyCODONE-acetaminophen 5-325 mg per tablet Commonly known as:  PERCOCET Your last dose was: Your next dose is:    
   
   
 Dose:  1 Tab Take 1 Tab by mouth every four (4) hours as needed for Pain.  Max Daily Amount: 6 Tabs. Quantity:  6 Tab Refills:  0 PROAIR HFA 90 mcg/actuation inhaler Generic drug:  albuterol Your last dose was: Your next dose is: Take  by inhalation. Refills:  0 PROzac 40 mg capsule Generic drug:  FLUoxetine Your last dose was: Your next dose is:    
   
   
 Dose:  40 mg Take 40 mg by mouth daily. Refills:  0 Vytorin 10/20 10-20 mg per tablet Generic drug:  ezetimibe-simvastatin Your last dose was: Your next dose is:    
   
   
 Dose:  1 Tab Take 1 Tab by mouth nightly. Refills:  0 STOP taking these medications   
 lisinopril 40 mg tablet Commonly known as:  PRINIVIL, ZESTRIL  
   
  
 traMADol 50 mg tablet Commonly known as:  ULTRAM  
   
  
  
  
Where to Get Your Medications Information on where to get these meds will be given to you by the nurse or doctor. ! Ask your nurse or doctor about these medications  
  oxyCODONE-acetaminophen 5-325 mg per tablet  
 trimethoprim-sulfamethoxazole 160-800 mg per tablet Discharge Instructions Patient armband removed and shredded DISCHARGE SUMMARY from Nurse The following personal items are in your possession at time of discharge: 
 
Dental Appliances: None Visual Aid: None Home Medications: Sent home Jewelry: None Clothing: Footwear, Pants, Shirt, Socks, Undergarments, With patient Other Valuables: Cell Phone, Tomy Anguiano PATIENT INSTRUCTIONS: 
 
 
F-face looks uneven A-arms unable to move or move unevenly S-speech slurred or non-existent T-time-call 911 as soon as signs and symptoms begin-DO NOT go Back to bed or wait to see if you get better-TIME IS BRAIN. Warning Signs of HEART ATTACK Call 911 if you have these symptoms: 
? Chest discomfort. Most heart attacks involve discomfort in the center of the chest that lasts more than a few minutes, or that goes away and comes back. It can feel like uncomfortable pressure, squeezing, fullness, or pain. ? Discomfort in other areas of the upper body. Symptoms can include pain or discomfort in one or both arms, the back, neck, jaw, or stomach. ? Shortness of breath with or without chest discomfort. ? Other signs may include breaking out in a cold sweat, nausea, or lightheadedness. Don't wait more than five minutes to call [address]! Fast action can save your life. Calling 911 is almost always the fastest way to get lifesaving treatment. Emergency Medical Services staff can begin treatment when they arrive  up to an hour sooner than if someone gets to the hospital by car. The discharge information has been reviewed with the patient. The patient verbalized understanding. Discharge medications reviewed with the patient and appropriate educational materials and side effects teaching were provided. Cellulitis: Care Instructions Your Care Instructions Cellulitis is a skin infection. It often occurs after a break in the skin from a scrape, cut, bite, or puncture, or after a rash. The doctor has checked you carefully, but problems can develop later. If you notice any problems or new symptoms, get medical treatment right away. Follow-up care is a key part of your treatment and safety. Be sure to make and go to all appointments, and call your doctor if you are having problems. It's also a good idea to know your test results and keep a list of the medicines you take. How can you care for yourself at home? · Take your antibiotics as directed. Do not stop taking them just because you feel better. You need to take the full course of antibiotics. · Prop up the infected area on pillows to reduce pain and swelling. Try to keep the area above the level of your heart as often as you can. · If your doctor told you how to care for your wound, follow your doctor's instructions. If you did not get instructions, follow this general advice: ¨ Wash the wound with clean water 2 times a day. Don't use hydrogen peroxide or alcohol, which can slow healing. ¨ You may cover the wound with a thin layer of petroleum jelly, such as Vaseline, and a nonstick bandage. ¨ Apply more petroleum jelly and replace the bandage as needed. · Be safe with medicines. Take pain medicines exactly as directed. ¨ If the doctor gave you a prescription medicine for pain, take it as prescribed. ¨ If you are not taking a prescription pain medicine, ask your doctor if you can take an over-the-counter medicine. To prevent cellulitis in the future · Try to prevent cuts, scrapes, or other injuries to your skin. Cellulitis most often occurs where there is a break in the skin. · If you get a scrape, cut, mild burn, or bite, wash the wound with clean water as soon as you can to help avoid infection. Don't use hydrogen peroxide or alcohol, which can slow healing. · If you have swelling in your legs (edema), support stockings and good skin care may help prevent leg sores and cellulitis. · Take care of your feet, especially if you have diabetes or other conditions that increase the risk of infection. Wear shoes and socks. Do not go barefoot. If you have athlete's foot or other skin problems on your feet, talk to your doctor about how to treat them. When should you call for help? Call your doctor now or seek immediate medical care if: 
· You have signs that your infection is getting worse, such as: 
¨ Increased pain, swelling, warmth, or redness. ¨ Red streaks leading from the area. ¨ Pus draining from the area. ¨ A fever. · You get a rash. Watch closely for changes in your health, and be sure to contact your doctor if: 
· You are not getting better after 1 day (24 hours). · You do not get better as expected. Where can you learn more? Go to http://duke-daniel.info/. Rafael Reinier in the search box to learn more about \"Cellulitis: Care Instructions. \" Current as of: October 13, 2016 Content Version: 11.3 © 3178-8826 [employer]. Care instructions adapted under license by [employer] (which disclaims liability or warranty for this information). If you have questions about a medical condition or this instruction, always ask your healthcare professional. [place] [address] any warranty or liability for your use of this information. Discharge Orders None Accord Announcement We are excited to announce that we are making your provider's discharge notes available to you in Accord. You will see these notes when they are completed and signed by the physician that discharged you from your recent hospital stay. If you have any questions or concerns about any information you see in Accord, please call the Health Information Department where you were seen or reach out to your Primary Care Provider for more information about your plan of care. Introducing [facility]! [address] introduces Accord patient portal. Now you can access parts of your medical record, email your doctor's office, and request medication refills online. 1. In your internet browser, go to [email or web address] 2. Click on the First Time User? Click Here link in the Sign In box. You will see the New Member Sign Up page. 3. Enter your Accord Access Code exactly as it appears below. You will not need to use this code after youve completed the sign-up process.  If you do not sign up before the expiration date, you must request a new code. · [employer] Access Code: [email or web address] Expires: 1/11/2018  1:29 AM 
 
4. Enter the last four digits of your Social Security Number (xxxx) and Date of Birth (mm/dd/yyyy) as indicated and click Submit. You will be taken to the next sign-up page. 5. Create a [employer] ID. This will be your [employer] login ID and cannot be changed, so think of one that is secure and easy to remember. 6. Create a [employer] password. You can change your password at any time. 7. Enter your Password Reset Question and Answer. This can be used at a later time if you forget your password. 8. Enter your e-mail address. You will receive e-mail notification when new information is available in [address] 9. Click Sign Up. You can now view and download portions of your medical record. 10. Click the Download Summary menu link to download a portable copy of your medical information. If you have questions, please visit the Frequently Asked Questions section of the [employer] website. Remember, [employer] is NOT to be used for urgent needs. For medical emergencies, dial 911. Now available from your iPhone and Android! General Information Please provide this summary of care documentation to your next provider. Patient Signature:  ____________________________________________________________ Date:  ____________________________________________________________  
  
Sandy Mclaughlin Provider Signature:  ____________________________________________________________ Date:  ____________________________________________________________

## 2017-10-13 NOTE — PROGRESS NOTES
Problem: Falls - Risk of  Goal: *Absence of Falls  Document Andrwe Fall Risk and appropriate interventions in the flowsheet.    Outcome: Progressing Towards Goal  Fall Risk Interventions:  Mobility Interventions: Patient to call before getting OOB, PT Consult for mobility concerns           Medication Interventions: Patient to call before getting OOB     Elimination Interventions: Call light in reach, Patient to call for help with toileting needs, Urinal in reach

## 2017-10-13 NOTE — PROGRESS NOTES
Patient has designated _______________grand mother_________ to participate in his/her discharge plan and to receive any needed information.      Name: alyssa munoz  Address:  Phone number:[phone]

## 2017-10-13 NOTE — PROGRESS NOTES
Problem: Discharge Planning  Goal: *Discharge to safe environment  Outcome: Progressing Towards Goal  Home poss hh

## 2017-10-13 NOTE — PROGRESS NOTES
Physical Exam   Skin:            1. Redness under breast folds; moist.   2. Redness and irritation under abdominal folds. 3. Scrotal redness. 4. Redness of the upper, inner left thigh area   5. Bilateral discoloration of the lower legs   6. Scabs on outer bilateral lower legs   7. Left inner ankle scar   8. Dry, cracked heels of both feet.

## 2017-10-13 NOTE — PROGRESS NOTES
conducted an initial consultation and Spiritual Assessment for Christian Ravi III, who is a 41 y.o.,male. Patients Primary Language is: [place]. According to the patients EMR Yarsani Affiliation is: No Sabianism. The reason the Patient came to the hospital is:   Patient Active Problem List    Diagnosis Date Noted    Cellulitis of leg, left 10/13/2017    Morbid obesity with BMI of 60.0-69.9, adult ([facility] 10/13/2017    DM (diabetes mellitus) ([facility] 10/13/2017    Hyperglycemia 10/13/2017    Nausea and vomiting 10/13/2017    Pain in right ankle 01/12/2012    Pain in left ankle 01/12/2012    Extreme obesity ([facility] 01/12/2012    [facility] 08/24/2011    HTN (hypertension) 08/24/2011        The  provided the following Interventions:  Initiated a relationship of care and support with patient in room 2106 this morning around 0920 as he was finishing his breakfast.  Listened as he related his story briefly and hopes for the future recovery. Provided information about Spiritual Care Services. Offered prayer and assurance of continued prayers on patients behalf. The following outcomes were achieved:  Patient shared limited information about his medical narrative and spiritual journey/beliefs. Patient processed feeling about current hospitalization. Patient expressed gratitude for pastoral care visit. Assessment:  Patient does not have any Evangelical/cultural needs that will affect patients preferences in health care. There are no further spiritual or Evangelical issues which require Spiritual Care Services interventions at this time. Plan:  Chaplains will continue to follow and will provide pastoral care on an as needed/requested basis    . Ayad Hunter   Spiritual Care   [phone]

## 2017-10-13 NOTE — PROGRESS NOTES
Kandace   Discharge Planning/ Assessment    Reasons for Intervention: Spoke with pt, lives with a roommate. designates his grandmother for dcp. States is disabled due to back and knees. Has mcr part A only. States he is independent with adls, amb. Uses a cane, has a cpap.no other dme. pcp dr Vanessa Sanchez. 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 Plan home, follow for hh needs.     High Risk Criteria  [x] Yes  []No   Physician Referral  [] Yes  [x]No        Date    Nursing Referral  [] Yes  [x]No        Date    Patient/Family Request  [] Yes  [x]No        Date       Resources:    Medicare  [x] Yes  []No   Medicaid  [] Yes  []No   No Resources  [] Yes  []No   Private Insurance  [] Yes  []No    Name/Phone Number    Other  [] Yes  []No        (i.e. Workman's Comp)         Prior Services:    Prior Services  [] Yes  [x]No   Home Health  [] Yes  []No   [address] [place]  [] Yes  []No        Number of [address]  [] Yes  []No       Meals on Wheels  [] Yes  []No   Office on Aging  [] Yes  []No   Transportation Services  [] Yes  []No   Nursing Home  [] Yes  []No        Nursing Home Name    [address]  [] Yes  []No        [address] Name    Other       Information Source:      Information obtained from  [x] Patient  [] Parent   [] [address]   [] Child  [] Spouse   [] Significant Other/Partner   [] Friend      [] EMS    [] Nursing Home Chart          [] Other:   Chart Review  [] Yes  []No     Family/Support System:    Patient lives with  [] Alone    [] Spouse   [] Significant Other  [] Children  [] Caretaker   [] Parent  [] Sibling     [x] Other       Other Support System:    Is the patient responsible for care of others  [] Yes  [x]No   Information of person caring for patient on  discharge    Managers financial affairs independently  [x] Yes  []No   If no, explain:      Status Prior to Admission:    Mental Status  [] Awake  [] Alert  [x] Oriented  [] Quiet/Calm [] Lethargic/Sedated   [] Disoriented  [] Restless/Anxious  [] Combative   Personal Care  [] Dependent  [x] Independent Personal Care  [] Requires Assistance   Meal Preparation Ability  [x] Independent   [] Standby Assistance   [] Minimal Assistance   [] Moderate Assistance  [] Maximum Assistance     [] Total Assistance   Chores  [x] Independent with Chores   [] N/A Nursing Home Resident   [] Requires Assistance   Bowel/Bladder  [] Continent  [] Catheter  [] Incontinent  [] Ostomy Self-Care    [] Urine Diversion Self-Care  [] Maximum Assistance     [] Total Assistance   Number of Persons needed for assistance    DME at home  [] Mariano Read, Shantel Fine  [x] Crosby Boys, Straight   [] Commode    [] Bathroom/Grab Bars  [] Hospital Bed  [] Nebulizer  [] Oxygen           [] Raised Toilet Seat  [] Shower Chair  [] Side Rails for Bed   [] Tub Transfer Bench   [] Keke Sink  [] Walker, Standard      [] Other:   Vendor      Treatment Presently Receiving:    Current Treatments  [] Chemotherapy  [] Dialysis  [] Insulin  [] IVAB [] IVF   [] O2  [] PCA   [] PT   [] RT   [] Tube Feedings   [] Wound Care     Psychosocial Evaluation:    Verbalized Knowledge of Disease Process  [x] Patient  []Family   Coping with Disease Process  [] Patient  []Family   Requires Further Counseling Coping with Disease Process  [] Patient  []Family     Identified Projected Needs:    Home Health Aid  [] Yes  []No   Transportation  [] Yes  []No   Education  [] Yes  []No        Specific Education     Financial Counseling  [] Yes  []No   Inability to Care for Self/Will Require 24 hour care  [] Yes  []No   Pain Management  [] Yes  []No   Home Infusion Therapy  [] Yes  []No   Oxygen Therapy  [] Yes  []No   DME  [] Yes  []No   Long Term Care Placement  [] Yes  []No   Rehab  [] Yes  []No   Physical Therapy  [] Yes  []No   Needs Anticipated At This Time  [] Yes  [x]No     Intra-Hospital Referral:    [facility]  [] Yes  [x]No     [] Yes  [x]No   Patient Representative  [] Yes  [x]No   Staff for Teaching Needs  [] Yes  [x]No   Specialty Teaching Needs     Diabetic Educator  [] Yes  [x]No   Referral for Diabetic Educator Needed  [] Yes  [x]No  If Yes, place order for Nutritionist or Diabetic Consult     Tentative Discharge Plan:    Home with No Services  [x] Yes  []No   Home with Home Health Follow-up  [x] Yes  []No        If Yes, specify type    [address]  [] Yes  []No        If Yes, specify type    Meals on Wheels  [] Yes  []No   Office of Aging  [] Yes  []No   NHP  [] Yes  []No   Return to the [facility]  [] Yes  []No   Rehab Therapy  [] Yes  []No   Acute Rehab  [] Yes  []No   Subacute Rehab  [] Yes  []No   Private Care  [] Yes  []No   Substance Abuse Referral  [] Yes  []No   Transportation  [] Yes  []No   Chore Service  [] Yes  []No   Inpatient Hospice  [] Yes  []No   OP RT  [] Yes  [] No   OP Hemo  [] Yes  [] No   OP PT  [] Yes  []No   Support Group  [] Yes  []No   Reach to Recovery  [] Yes  []No   OP Oncology Clinic  [] Yes  []No   Clinic Appointment  [] Yes  []No   DME  [] Yes  []No   Comments    Name of D/C Planner or  Given to Patient or Family Connie jansen rn cm    Phone Number [phone]        Extension    Date 10/13/17   Time    If you are discharged home, whom do you designate to participate in your discharge plan and receive any information needed?      Enter name of designee         Phone # of designee         Address of designee         Updated         Patient refused to designate any           individual

## 2017-10-14 LAB
ANION GAP SERPL CALC-SCNC: 7 MMOL/L (ref 3–18)
BACTERIA SPEC CULT: NORMAL
BASOPHILS # BLD: 0 K/UL (ref 0–0.06)
BASOPHILS NFR BLD: 0 % (ref 0–2)
BUN SERPL-MCNC: 12 MG/DL (ref 7–18)
BUN/CREAT SERPL: 13 (ref 12–20)
CALCIUM SERPL-MCNC: 8.6 MG/DL (ref 8.5–10.1)
CHLORIDE SERPL-SCNC: 104 MMOL/L (ref 100–108)
CO2 SERPL-SCNC: 27 MMOL/L (ref 21–32)
CREAT SERPL-MCNC: 0.89 MG/DL (ref 0.6–1.3)
DATE LAST DOSE: ABNORMAL
DIFFERENTIAL METHOD BLD: ABNORMAL
EOSINOPHIL # BLD: 0.5 K/UL (ref 0–0.4)
EOSINOPHIL NFR BLD: 3 % (ref 0–5)
ERYTHROCYTE [DISTWIDTH] IN BLOOD BY AUTOMATED COUNT: 12.6 % (ref 11.6–14.5)
GLUCOSE BLD STRIP.AUTO-MCNC: 202 MG/DL (ref 70–110)
GLUCOSE BLD STRIP.AUTO-MCNC: 251 MG/DL (ref 70–110)
GLUCOSE BLD STRIP.AUTO-MCNC: 262 MG/DL (ref 70–110)
GLUCOSE BLD STRIP.AUTO-MCNC: 269 MG/DL (ref 70–110)
GLUCOSE BLD STRIP.AUTO-MCNC: 283 MG/DL (ref 70–110)
GLUCOSE SERPL-MCNC: 279 MG/DL (ref 74–99)
HCT VFR BLD AUTO: 41.3 % (ref 36–48)
HGB BLD-MCNC: 13.7 G/DL (ref 13–16)
LYMPHOCYTES # BLD: 1.9 K/UL (ref 0.9–3.6)
LYMPHOCYTES NFR BLD: 13 % (ref 21–52)
MCH RBC QN AUTO: 30.4 PG (ref 24–34)
MCHC RBC AUTO-ENTMCNC: 33.2 G/DL (ref 31–37)
MCV RBC AUTO: 91.8 FL (ref 74–97)
MONOCYTES # BLD: 1.6 K/UL (ref 0.05–1.2)
MONOCYTES NFR BLD: 12 % (ref 3–10)
NEUTS SEG # BLD: 10 K/UL (ref 1.8–8)
NEUTS SEG NFR BLD: 72 % (ref 40–73)
PLATELET # BLD AUTO: 201 K/UL (ref 135–420)
PMV BLD AUTO: 10.4 FL (ref 9.2–11.8)
POTASSIUM SERPL-SCNC: 4 MMOL/L (ref 3.5–5.5)
RBC # BLD AUTO: 4.5 M/UL (ref 4.7–5.5)
REPORTED DOSE,DOSE: ABNORMAL UNITS
REPORTED DOSE/TIME,TMG: 800
SERVICE CMNT-IMP: NORMAL
SODIUM SERPL-SCNC: 138 MMOL/L (ref 136–145)
VANCOMYCIN TROUGH SERPL-MCNC: 21.2 UG/ML (ref 10–20)
WBC # BLD AUTO: 14 K/UL (ref 4.6–13.2)

## 2017-10-14 PROCEDURE — 90686 IIV4 VACC NO PRSV 0.5 ML IM: CPT | Performed by: HOSPITALIST

## 2017-10-14 PROCEDURE — 36415 COLL VENOUS BLD VENIPUNCTURE: CPT | Performed by: FAMILY MEDICINE

## 2017-10-14 PROCEDURE — 80202 ASSAY OF VANCOMYCIN: CPT | Performed by: HOSPITALIST

## 2017-10-14 PROCEDURE — 74011250637 HC RX REV CODE- 250/637: Performed by: FAMILY MEDICINE

## 2017-10-14 PROCEDURE — 74011000258 HC RX REV CODE- 258: Performed by: EMERGENCY MEDICINE

## 2017-10-14 PROCEDURE — 65270000029 HC RM PRIVATE

## 2017-10-14 PROCEDURE — 74011636637 HC RX REV CODE- 636/637: Performed by: HOSPITALIST

## 2017-10-14 PROCEDURE — 74011250636 HC RX REV CODE- 250/636: Performed by: FAMILY MEDICINE

## 2017-10-14 PROCEDURE — 85025 COMPLETE CBC W/AUTO DIFF WBC: CPT | Performed by: FAMILY MEDICINE

## 2017-10-14 PROCEDURE — 74011250636 HC RX REV CODE- 250/636: Performed by: EMERGENCY MEDICINE

## 2017-10-14 PROCEDURE — 80048 BASIC METABOLIC PNL TOTAL CA: CPT | Performed by: FAMILY MEDICINE

## 2017-10-14 PROCEDURE — 74011250636 HC RX REV CODE- 250/636: Performed by: HOSPITALIST

## 2017-10-14 PROCEDURE — 77030020263 HC SOL INJ SOD CL0.9% LFCR 1000ML

## 2017-10-14 PROCEDURE — 90471 IMMUNIZATION ADMIN: CPT

## 2017-10-14 PROCEDURE — 74011250637 HC RX REV CODE- 250/637: Performed by: HOSPITALIST

## 2017-10-14 PROCEDURE — 82962 GLUCOSE BLOOD TEST: CPT

## 2017-10-14 RX ORDER — VENLAFAXINE 37.5 MG/1
150 TABLET ORAL ONCE
Status: COMPLETED | OUTPATIENT
Start: 2017-10-14 | End: 2017-10-14

## 2017-10-14 RX ORDER — INSULIN LISPRO 100 [IU]/ML
3 INJECTION, SOLUTION INTRAVENOUS; SUBCUTANEOUS
Status: DISCONTINUED | OUTPATIENT
Start: 2017-10-14 | End: 2017-10-15 | Stop reason: HOSPADM

## 2017-10-14 RX ADMIN — SODIUM CHLORIDE 2000 MG: 900 INJECTION, SOLUTION INTRAVENOUS at 18:16

## 2017-10-14 RX ADMIN — OXYCODONE HYDROCHLORIDE AND ACETAMINOPHEN 1 TABLET: 5; 325 TABLET ORAL at 02:44

## 2017-10-14 RX ADMIN — VENLAFAXINE 150 MG: 37.5 TABLET ORAL at 11:05

## 2017-10-14 RX ADMIN — INSULIN DETEMIR 30 UNITS: 100 INJECTION, SOLUTION SUBCUTANEOUS at 09:00

## 2017-10-14 RX ADMIN — OXYCODONE HYDROCHLORIDE AND ACETAMINOPHEN 1 TABLET: 5; 325 TABLET ORAL at 23:41

## 2017-10-14 RX ADMIN — CEFTRIAXONE 2 G: 2 INJECTION, POWDER, FOR SOLUTION INTRAMUSCULAR; INTRAVENOUS at 04:03

## 2017-10-14 RX ADMIN — INSULIN LISPRO 3 UNITS: 100 INJECTION, SOLUTION INTRAVENOUS; SUBCUTANEOUS at 12:42

## 2017-10-14 RX ADMIN — ENOXAPARIN SODIUM 40 MG: 40 INJECTION SUBCUTANEOUS at 06:57

## 2017-10-14 RX ADMIN — OXYCODONE HYDROCHLORIDE AND ACETAMINOPHEN 1 TABLET: 5; 325 TABLET ORAL at 09:13

## 2017-10-14 RX ADMIN — INSULIN LISPRO 3 UNITS: 100 INJECTION, SOLUTION INTRAVENOUS; SUBCUTANEOUS at 09:00

## 2017-10-14 RX ADMIN — INSULIN DETEMIR 35 UNITS: 100 INJECTION, SOLUTION SUBCUTANEOUS at 23:33

## 2017-10-14 RX ADMIN — SODIUM CHLORIDE 2000 MG: 900 INJECTION, SOLUTION INTRAVENOUS at 09:09

## 2017-10-14 RX ADMIN — INSULIN LISPRO 12 UNITS: 100 INJECTION, SOLUTION INTRAVENOUS; SUBCUTANEOUS at 12:42

## 2017-10-14 RX ADMIN — INSULIN LISPRO 12 UNITS: 100 INJECTION, SOLUTION INTRAVENOUS; SUBCUTANEOUS at 09:01

## 2017-10-14 RX ADMIN — SODIUM CHLORIDE 100 ML/HR: 900 INJECTION, SOLUTION INTRAVENOUS at 06:55

## 2017-10-14 RX ADMIN — LISINOPRIL 40 MG: 40 TABLET ORAL at 08:59

## 2017-10-14 RX ADMIN — INSULIN LISPRO 9 UNITS: 100 INJECTION, SOLUTION INTRAVENOUS; SUBCUTANEOUS at 18:16

## 2017-10-14 RX ADMIN — INSULIN LISPRO 3 UNITS: 100 INJECTION, SOLUTION INTRAVENOUS; SUBCUTANEOUS at 18:15

## 2017-10-14 RX ADMIN — INSULIN LISPRO 9 UNITS: 100 INJECTION, SOLUTION INTRAVENOUS; SUBCUTANEOUS at 23:33

## 2017-10-14 RX ADMIN — INFLUENZA VIRUS VACCINE 0.5 ML: 15; 15; 15; 15 SUSPENSION INTRAMUSCULAR at 12:42

## 2017-10-14 NOTE — PROGRESS NOTES
Received resting in bed,call light,phone, with in reach. Hob elevated. Fall precautions. 10/14/17 0600 Pt has ambulated to the bathroom x 3 this shift to void. Tolerated activity well.

## 2017-10-14 NOTE — PROGRESS NOTES
Patient received at beginning of shift from [facility] RN, Alvarado, in bed, awake. Pt does not appear to be in distress and denies any pain and/or discomfort. Safety measures take include bed in lowest locked position, call bell within reach, and personal items at bedside within reach. Pt verbalizes understanding of use of call bell and the need to call for assistance to ensure safety. 1780: During morning med pass Pt refused 2 meds and told me that the meds he is on here do not match what hes taking at home. Paged Dr. Blas Galloway. Held all meds patient refused. 0945: Dr. Leonora Reardon returned page. Discontinued 2 meds and ordered effexor. 1945: Bedside and Verbal shift change report given to Ly Lee (oncoming nurse) by Orquidea Tan RN (offgoing nurse). Report included the following information SBAR, Kardex, Intake/Output, MAR and Recent Results.

## 2017-10-14 NOTE — PROGRESS NOTES
Patient admitted early this AM by Dr Calin Flowers patient earlier today  He has prominent cellulitis involving the medial aspect of left thigh  This is in the setting of super morbid obesity  He feels okay  Continue antibiotics.

## 2017-10-14 NOTE — PROGRESS NOTES
Problem: Falls - Risk of  Goal: *Absence of Falls  Document Andrew Fall Risk and appropriate interventions in the flowsheet.    Outcome: Progressing Towards Goal  Fall Risk Interventions:  Mobility Interventions: Patient to call before getting OOB, PT Consult for mobility concerns           Medication Interventions: Patient to call before getting OOB     Elimination Interventions: Call light in reach, Patient to call for help with toileting needs, Urinal in reach

## 2017-10-14 NOTE — PROGRESS NOTES
Pharmacy Dosing Services: Vancomycin    Indication: skin and soft tissue infection    Day of therapy: 1    Other Antimicrobials (Include dose, start day & day of therapy):  Ceftriaxone 2 gm IV every 24 hours (started 10/13)      Loading dose (date given): 1g + 1g  Current Maintenance dose: new start    Goal Vancomycin Level: 15-20  (Trough 15-20 for most infections, 20 for meningitis/osteomyelitis, pre-HD level ~25)    Vancomycin Level (if drawn):   10/14 1504 - 21.2 (6 hours post-dose - extrapolated trough after 8 hours is 18.7)    Significant Cultures:   10/13: blood - pending   10/13: urine - pending    Renal function stable? (unstable defined as SCr increase of 0.5 mg/dL or > 50% increase from baseline, whichever is greater) (Y/N): Y     CAPD, Hemodialysis or Renal Replacement Therapy (Y/N): N     Recent Labs      10/14/17   0450  10/13/17   0729  10/13/17   0143   CREA  0.89  1.04  1.10   BUN  12  13  12   WBC  14.0*  21.7*  22.4*     Temp (24hrs), Av.6 °F (37 °C), Min:97.6 °F (36.4 °C), Max:99.5 °F (37.5 °C)    Creatinine Clearance (Creatinine Clearance (ml/min)): >100 ml/min     Regimen assessment: Continue regimen. Will keep same dose but will restart a few hours later to ensure clearing as patients with higher BMI tend to retain  Maintenance dose: 2g IV q8h  Next scheduled level: 10/17 @ [facility] 27 will follow daily and adjust medications as appropriate for renal function and/or serum levels.     Thank you,  Joe Shane, PHARMD

## 2017-10-14 NOTE — ROUTINE PROCESS
Bedside and Verbal shift change report given to Brijesh Beaver (oncoming nurse) by yo modi rn (offgoing nurse). Report given with SBAR, Kardex, Intake/Output and Recent Results.

## 2017-10-14 NOTE — PROGRESS NOTES
Problem: Falls - Risk of  Goal: *Absence of Falls  Document Andrew Fall Risk and appropriate interventions in the flowsheet.    Outcome: Progressing Towards Goal  Fall Risk Interventions:  Mobility Interventions: Patient to call before getting OOB           Medication Interventions: Patient to call before getting OOB     Elimination Interventions: Call light in reach

## 2017-10-14 NOTE — PROGRESS NOTES
Medicine Progress Note    Patient: Samantha Padilla III   Age:  41 y.o.  DOA: 10/13/2017   Admit Dx / CC: Cellulitis of leg, left  Cellulitis of leg, left  Hyperglycemia  LOS:  LOS: 1 day     Assessment/Plan   Principal Problem:    Cellulitis of leg, left (10/13/2017)    Active Problems: Morbid obesity with BMI of 60.0-69.9, adult ([facility] [facility] (10/13/2017)      DM (diabetes mellitus) ([facility] (10/13/2017)      Hyperglycemia (10/13/2017)      Nausea and vomiting (10/13/2017)        Additional Plan notes     Cellulitis Left leg - On Vancomycin and Rocephin. Pain control as needed. Follow blood culture      Morbid obesity - advise life style changes, diet exercise. May be a candidate for bariatric surgery     DM - poor control. A1c 12.3. Blood glucose 255, increase to 35 BID levemir     Chronic pain - neck, back - Valium for muscle relaxants and perocet prn home dose      DISPO     Anticipated Date of Discharge: 10/15  Anticipated Disposition (home, SNF) : home    Subjective:   Patient seen and examined. States less pain in leg than yesterday    Objective:     Visit Vitals    /86    Pulse 78    Temp 97.6 °F (36.4 °C)    Resp 14    Ht 6' 4\" (1.93 m)    Wt (!) 258.6 kg (570 lb)    SpO2 93%    BMI 69.38 kg/m2       Physical Exam:  General appearance: alert, cooperative, no distress, appears stated age  Head: Normocephalic, without obvious abnormality, atraumatic  Neck: supple, trachea midline  Lungs: clear to auscultation bilaterally  Heart: regular rate and rhythm, S1, S2 normal, no murmur, click, rub or gallop  Abdomen: soft, non-tender.  Bowel sounds normal. No masses,  no organomegaly  Extremities: extremities normal, atraumatic, no cyanosis or edema  Skin: Left leg with cellulitis above knee medially extending to groin, appears to be improving    Intake and Output:  Current Shift:  10/14 0701 - 10/14 1900  In: 240 [P.O.:240]  Out: -   Last three shifts:  10/12 1901 - 10/14 0700  In: 2264 [P.O.:1489; I.V.:775]  Out: 2400 [Urine:2400]    Lab/Data Reviewed:  CMP:   Lab Results   Component Value Date/Time     10/14/2017 04:50 AM    K 4.0 10/14/2017 04:50 AM     10/14/2017 04:50 AM    CO2 27 10/14/2017 04:50 AM    AGAP 7 10/14/2017 04:50 AM     (H) 10/14/2017 04:50 AM    BUN 12 10/14/2017 04:50 AM    CREA 0.89 10/14/2017 04:50 AM    GFRAA >60 10/14/2017 04:50 AM    GFRNA >60 10/14/2017 04:50 AM    CA 8.6 10/14/2017 04:50 AM     CBC:   Lab Results   Component Value Date/Time    WBC 14.0 (H) 10/14/2017 04:50 AM    HGB 13.7 10/14/2017 04:50 AM    HCT 41.3 10/14/2017 04:50 AM     10/14/2017 04:50 AM     All Cardiac Markers in the last 24 hours: No results found for: CPK, CK, CKMMB, CKMB, RCK3, CKMBT, CKNDX, CKND1, DANE, TROPT, TROIQ, RAZ, TROPT, TNIPOC, BNP, BNPP    Medications Reviewed:  Current Facility-Administered Medications   Medication Dose Route Frequency    insulin detemir (LEVEMIR) injection 30 Units  30 Units SubCUTAneous Q12H    insulin lispro (HUMALOG) injection 3 Units  3 Units SubCUTAneous TIDAC    sodium chloride (NS) flush 5-10 mL  5-10 mL IntraVENous PRN    cefTRIAXone (ROCEPHIN) 2 g in 0.9% sodium chloride (MBP/ADV) 50 mL MBP  2 g IntraVENous Q24H    VANCOMYCIN INFORMATION NOTE   Other Rx Dosing/Monitoring    lisinopril (PRINIVIL, ZESTRIL) tablet 40 mg  40 mg Oral DAILY    0.9% sodium chloride infusion  100 mL/hr IntraVENous CONTINUOUS    acetaminophen (TYLENOL) tablet 650 mg  650 mg Oral Q4H PRN    oxyCODONE-acetaminophen (PERCOCET) 5-325 mg per tablet 1 Tab  1 Tab Oral Q4H PRN    naloxone (NARCAN) injection 0.4 mg  0.4 mg IntraVENous PRN    morphine injection 2 mg  2 mg IntraVENous Q4H PRN    ondansetron (ZOFRAN) injection 4 mg  4 mg IntraVENous Q4H PRN    docusate sodium (COLACE) capsule 100 mg  100 mg Oral BID    enoxaparin (LOVENOX) injection 40 mg  40 mg SubCUTAneous Q24H    glucose chewable tablet 16 g  4 Tab Oral PRN    glucagon (GLUCAGEN) injection 1 mg  1 mg IntraMUSCular PRN    dextrose (D50W) injection syrg 12.5-25 g  25-50 mL IntraVENous PRN    diazePAM (VALIUM) injection 5 mg  5 mg IntraVENous Q6H PRN    vancomycin (VANCOCIN) 2,000 mg in 0.9% sodium chloride 500 mL IVPB  2,000 mg IntraVENous Q8H    VANCOMYCIN INFORMATION NOTE   Other ONCE    VANCOMYCIN INFORMATION NOTE 1 Each  1 Each Other Rx Dosing/Monitoring    insulin lispro (HUMALOG) injection   SubCUTAneous AC&HS    albuterol (PROVENTIL VENTOLIN) nebulizer solution 2.5 mg  2.5 mg Nebulization Q4H PRN    influenza vaccine 2017-18 (3 yrs+)(PF) (FLUZONE QUAD/FLUARIX QUAD) injection 0.5 mL  0.5 mL IntraMUSCular PRIOR TO DISCHARGE       Lizzy Edwards MD    October 14, 2017

## 2017-10-14 NOTE — PROGRESS NOTES
1553: In this patient's chart in order to record critical lab value, vanco trough level of 21.2. Primary nurse, Electa Brittle, RN, notified.

## 2017-10-15 VITALS
BODY MASS INDEX: 38.36 KG/M2 | HEIGHT: 76 IN | DIASTOLIC BLOOD PRESSURE: 85 MMHG | SYSTOLIC BLOOD PRESSURE: 148 MMHG | RESPIRATION RATE: 14 BRPM | TEMPERATURE: 97.6 F | OXYGEN SATURATION: 93 % | HEART RATE: 68 BPM | WEIGHT: 315 LBS

## 2017-10-15 LAB
ANION GAP SERPL CALC-SCNC: 6 MMOL/L (ref 3–18)
BASOPHILS # BLD: 0 K/UL (ref 0–0.06)
BASOPHILS NFR BLD: 0 % (ref 0–2)
BUN SERPL-MCNC: 9 MG/DL (ref 7–18)
BUN/CREAT SERPL: 12 (ref 12–20)
CALCIUM SERPL-MCNC: 8.3 MG/DL (ref 8.5–10.1)
CHLORIDE SERPL-SCNC: 106 MMOL/L (ref 100–108)
CO2 SERPL-SCNC: 28 MMOL/L (ref 21–32)
CREAT SERPL-MCNC: 0.73 MG/DL (ref 0.6–1.3)
DIFFERENTIAL METHOD BLD: ABNORMAL
EOSINOPHIL # BLD: 0.7 K/UL (ref 0–0.4)
EOSINOPHIL NFR BLD: 6 % (ref 0–5)
ERYTHROCYTE [DISTWIDTH] IN BLOOD BY AUTOMATED COUNT: 12.6 % (ref 11.6–14.5)
GLUCOSE BLD STRIP.AUTO-MCNC: 193 MG/DL (ref 70–110)
GLUCOSE BLD STRIP.AUTO-MCNC: 209 MG/DL (ref 70–110)
GLUCOSE BLD STRIP.AUTO-MCNC: 237 MG/DL (ref 70–110)
GLUCOSE SERPL-MCNC: 213 MG/DL (ref 74–99)
HCT VFR BLD AUTO: 41.5 % (ref 36–48)
HGB BLD-MCNC: 13.6 G/DL (ref 13–16)
LYMPHOCYTES # BLD: 2.2 K/UL (ref 0.9–3.6)
LYMPHOCYTES NFR BLD: 20 % (ref 21–52)
MCH RBC QN AUTO: 30.2 PG (ref 24–34)
MCHC RBC AUTO-ENTMCNC: 32.8 G/DL (ref 31–37)
MCV RBC AUTO: 92.2 FL (ref 74–97)
MONOCYTES # BLD: 1.4 K/UL (ref 0.05–1.2)
MONOCYTES NFR BLD: 12 % (ref 3–10)
NEUTS SEG # BLD: 7.1 K/UL (ref 1.8–8)
NEUTS SEG NFR BLD: 62 % (ref 40–73)
PLATELET # BLD AUTO: 209 K/UL (ref 135–420)
PMV BLD AUTO: 11 FL (ref 9.2–11.8)
POTASSIUM SERPL-SCNC: 4.1 MMOL/L (ref 3.5–5.5)
RBC # BLD AUTO: 4.5 M/UL (ref 4.7–5.5)
SODIUM SERPL-SCNC: 140 MMOL/L (ref 136–145)
WBC # BLD AUTO: 11.4 K/UL (ref 4.6–13.2)

## 2017-10-15 PROCEDURE — 36415 COLL VENOUS BLD VENIPUNCTURE: CPT | Performed by: FAMILY MEDICINE

## 2017-10-15 PROCEDURE — 82962 GLUCOSE BLOOD TEST: CPT

## 2017-10-15 PROCEDURE — 74011250636 HC RX REV CODE- 250/636: Performed by: EMERGENCY MEDICINE

## 2017-10-15 PROCEDURE — 74011000258 HC RX REV CODE- 258: Performed by: EMERGENCY MEDICINE

## 2017-10-15 PROCEDURE — 74011250636 HC RX REV CODE- 250/636: Performed by: FAMILY MEDICINE

## 2017-10-15 PROCEDURE — 74011636637 HC RX REV CODE- 636/637: Performed by: HOSPITALIST

## 2017-10-15 PROCEDURE — 80048 BASIC METABOLIC PNL TOTAL CA: CPT | Performed by: FAMILY MEDICINE

## 2017-10-15 PROCEDURE — 74011250637 HC RX REV CODE- 250/637: Performed by: FAMILY MEDICINE

## 2017-10-15 PROCEDURE — 74011250636 HC RX REV CODE- 250/636: Performed by: HOSPITALIST

## 2017-10-15 PROCEDURE — 85025 COMPLETE CBC W/AUTO DIFF WBC: CPT | Performed by: FAMILY MEDICINE

## 2017-10-15 RX ORDER — SULFAMETHOXAZOLE AND TRIMETHOPRIM 800; 160 MG/1; MG/1
1 TABLET ORAL 2 TIMES DAILY
Qty: 20 TAB | Refills: 0 | Status: SHIPPED | OUTPATIENT
Start: 2017-10-15

## 2017-10-15 RX ORDER — OXYCODONE AND ACETAMINOPHEN 5; 325 MG/1; MG/1
1 TABLET ORAL
Qty: 6 TAB | Refills: 0 | Status: SHIPPED | OUTPATIENT
Start: 2017-10-15

## 2017-10-15 RX ADMIN — SODIUM CHLORIDE 2000 MG: 900 INJECTION, SOLUTION INTRAVENOUS at 02:51

## 2017-10-15 RX ADMIN — INSULIN LISPRO 9 UNITS: 100 INJECTION, SOLUTION INTRAVENOUS; SUBCUTANEOUS at 14:01

## 2017-10-15 RX ADMIN — INSULIN LISPRO 3 UNITS: 100 INJECTION, SOLUTION INTRAVENOUS; SUBCUTANEOUS at 08:51

## 2017-10-15 RX ADMIN — CEFTRIAXONE 2 G: 2 INJECTION, POWDER, FOR SOLUTION INTRAMUSCULAR; INTRAVENOUS at 06:08

## 2017-10-15 RX ADMIN — LISINOPRIL 40 MG: 40 TABLET ORAL at 08:50

## 2017-10-15 RX ADMIN — INSULIN LISPRO 9 UNITS: 100 INJECTION, SOLUTION INTRAVENOUS; SUBCUTANEOUS at 08:51

## 2017-10-15 RX ADMIN — INSULIN LISPRO 3 UNITS: 100 INJECTION, SOLUTION INTRAVENOUS; SUBCUTANEOUS at 14:01

## 2017-10-15 RX ADMIN — INSULIN DETEMIR 35 UNITS: 100 INJECTION, SOLUTION SUBCUTANEOUS at 08:51

## 2017-10-15 RX ADMIN — SODIUM CHLORIDE 2000 MG: 900 INJECTION, SOLUTION INTRAVENOUS at 11:04

## 2017-10-15 RX ADMIN — ENOXAPARIN SODIUM 40 MG: 40 INJECTION SUBCUTANEOUS at 06:08

## 2017-10-15 NOTE — DISCHARGE INSTRUCTIONS
Patient armband removed and shredded    DISCHARGE SUMMARY from Nurse    The following personal items are in your possession at time of discharge:    Dental Appliances: None  Visual Aid: None     Home Medications: Sent home  Jewelry: None  Clothing: Footwear, Pants, Shirt, Socks, Undergarments, With patient  Other Valuables: Cell Phone, Eyeglasses, Wallet             PATIENT INSTRUCTIONS:    After general anesthesia or intravenous sedation, for 24 hours or while taking prescription Narcotics:  · Limit your activities  · Do not drive and operate hazardous machinery  · Do not make important personal or business decisions  · Do  not drink alcoholic beverages  · If you have not urinated within 8 hours after discharge, please contact your surgeon on call. Report the following to your surgeon:  · Excessive pain, swelling, redness or odor of or around the surgical area  · Temperature over 100.5  · Nausea and vomiting lasting longer than 4 hours or if unable to take medications  · Any signs of decreased circulation or nerve impairment to extremity: change in color, persistent  numbness, tingling, coldness or increase pain  · Any questions        What to do at Home:  Recommended activity: Activity as tolerated. If you experience any of the following symptoms listed under \"When to call for Help\", please follow up with your PCP or call 911. *  Please give a list of your current medications to your Primary Care Provider. *  Please update this list whenever your medications are discontinued, doses are      changed, or new medications (including over-the-counter products) are added. *  Please carry medication information at all times in case of emergency situations. These are general instructions for a healthy lifestyle:    No smoking/ No tobacco products/ Avoid exposure to second hand smoke    Surgeon General's Warning:  Quitting smoking now greatly reduces serious risk to your health.     Obesity, smoking, and sedentary lifestyle greatly increases your risk for illness    A healthy diet, regular physical exercise & weight monitoring are important for maintaining a healthy lifestyle    You may be retaining fluid if you have a history of heart failure or if you experience any of the following symptoms:  Weight gain of 3 pounds or more overnight or 5 pounds in a week, increased swelling in our hands or feet or shortness of breath while lying flat in bed. Please call your doctor as soon as you notice any of these symptoms; do not wait until your next office visit. Recognize signs and symptoms of STROKE:    F-face looks uneven    A-arms unable to move or move unevenly    S-speech slurred or non-existent    T-time-call 911 as soon as signs and symptoms begin-DO NOT go       Back to bed or wait to see if you get better-TIME IS BRAIN. Warning Signs of HEART ATTACK     Call 911 if you have these symptoms:   Chest discomfort. Most heart attacks involve discomfort in the center of the chest that lasts more than a few minutes, or that goes away and comes back. It can feel like uncomfortable pressure, squeezing, fullness, or pain.  Discomfort in other areas of the upper body. Symptoms can include pain or discomfort in one or both arms, the back, neck, jaw, or stomach.  Shortness of breath with or without chest discomfort.  Other signs may include breaking out in a cold sweat, nausea, or lightheadedness. Don't wait more than five minutes to call 911 - MINUTES MATTER! Fast action can save your life. Calling 911 is almost always the fastest way to get lifesaving treatment. Emergency Medical Services staff can begin treatment when they arrive -- up to an hour sooner than if someone gets to the hospital by car. The discharge information has been reviewed with the patient. The patient verbalized understanding.     Discharge medications reviewed with the patient and appropriate educational materials and side effects teaching were provided. Cellulitis: Care Instructions  Your Care Instructions    Cellulitis is a skin infection. It often occurs after a break in the skin from a scrape, cut, bite, or puncture, or after a rash. The doctor has checked you carefully, but problems can develop later. If you notice any problems or new symptoms, get medical treatment right away. Follow-up care is a key part of your treatment and safety. Be sure to make and go to all appointments, and call your doctor if you are having problems. It's also a good idea to know your test results and keep a list of the medicines you take. How can you care for yourself at home? · Take your antibiotics as directed. Do not stop taking them just because you feel better. You need to take the full course of antibiotics. · Prop up the infected area on pillows to reduce pain and swelling. Try to keep the area above the level of your heart as often as you can. · If your doctor told you how to care for your wound, follow your doctor's instructions. If you did not get instructions, follow this general advice:  ¨ Wash the wound with clean water 2 times a day. Don't use hydrogen peroxide or alcohol, which can slow healing. ¨ You may cover the wound with a thin layer of petroleum jelly, such as Vaseline, and a nonstick bandage. ¨ Apply more petroleum jelly and replace the bandage as needed. · Be safe with medicines. Take pain medicines exactly as directed. ¨ If the doctor gave you a prescription medicine for pain, take it as prescribed. ¨ If you are not taking a prescription pain medicine, ask your doctor if you can take an over-the-counter medicine. To prevent cellulitis in the future  · Try to prevent cuts, scrapes, or other injuries to your skin. Cellulitis most often occurs where there is a break in the skin. · If you get a scrape, cut, mild burn, or bite, wash the wound with clean water as soon as you can to help avoid infection. Don't use hydrogen peroxide or alcohol, which can slow healing. · If you have swelling in your legs (edema), support stockings and good skin care may help prevent leg sores and cellulitis. · Take care of your feet, especially if you have diabetes or other conditions that increase the risk of infection. Wear shoes and socks. Do not go barefoot. If you have athlete's foot or other skin problems on your feet, talk to your doctor about how to treat them. When should you call for help? Call your doctor now or seek immediate medical care if:  · You have signs that your infection is getting worse, such as:  ¨ Increased pain, swelling, warmth, or redness. ¨ Red streaks leading from the area. ¨ Pus draining from the area. ¨ A fever. · You get a rash. Watch closely for changes in your health, and be sure to contact your doctor if:  · You are not getting better after 1 day (24 hours). · You do not get better as expected. Where can you learn more? Go to http://duke-daniel.info/. Anna Daily in the search box to learn more about \"Cellulitis: Care Instructions. \"  Current as of: October 13, 2016  Content Version: 11.3  © 5939-5207 [employer]. Care instructions adapted under license by [employer] (which disclaims liability or warranty for this information). If you have questions about a medical condition or this instruction, always ask your healthcare professional. [place] [address] any warranty or liability for your use of this information.

## 2017-10-15 NOTE — DISCHARGE SUMMARY
Discharge Summary    Patient: Colette Santiago III               Sex: male          DOA: 10/13/2017         YOB: 1970      Age:  41 y.o.        LOS:  LOS: 2 days                Admit Date: 10/13/2017    Discharge Date: 10/15/2017    Discharge Medications:     Current Discharge Medication List      START taking these medications    Details   trimethoprim-sulfamethoxazole (BACTRIM DS) 160-800 mg per tablet Take 1 Tab by mouth two (2) times a day. Qty: 20 Tab, Refills: 0         CONTINUE these medications which have CHANGED    Details   oxyCODONE-acetaminophen (PERCOCET) 5-325 mg per tablet Take 1 Tab by mouth every four (4) hours as needed for Pain. Max Daily Amount: 6 Tabs. Qty: 6 Tab, Refills: 0         CONTINUE these medications which have NOT CHANGED    Details   albuterol (PROAIR HFA) 90 mcg/actuation inhaler Take  by inhalation. insulin glargine (LANTUS SOLOSTAR) 100 unit/mL (3 mL) inpn 46 Units by SubCUTAneous route. nebivolol (BYSTOLIC) 10 mg tablet Take 10 mg by mouth daily. venlafaxine-SR (EFFEXOR XR) 150 mg capsule Take 150 mg by mouth daily. olmesartan-hydrochlorothiazide (BENICAR HCT) 40-25 mg per tablet Take 1 Tab by mouth daily. fluoxetine (PROZAC) 40 mg capsule Take 40 mg by mouth daily. MULTIVITAMIN PO Take  by mouth.      ezetimibe-simvastatin (VYTORIN 10/20) 10-20 mg per tablet Take 1 Tab by mouth nightly. STOP taking these medications       lisinopril (PRINIVIL, ZESTRIL) 40 mg tablet Comments:   Reason for Stopping:         tramadol (ULTRAM) 50 mg tablet Comments:   Reason for Stopping:                Follow-up: PCP    Discharge Condition: Stable    Activity: Activity as tolerated    Diet: Resume previous diet    Labs:  Labs: Results:       Chemistry Recent Labs      10/15/17   0410  10/14/17   0450  10/13/17   0729  10/13/17   0143   GLU  213*  279*  286*  355*   NA  140  138  136  134*   K  4.1  4.0  4.3  4.2   CL  106  104  100  97*   CO2 28  27  28  26   BUN  9  12  13  12   CREA  0.73  0.89  1.04  1.10   CA  8.3*  8.6  9.0  8.9   AGAP  6  7  8  11   BUCR  12  13  13  11*   AP   --    --    --   141*   TP   --    --    --   7.7   ALB   --    --    --   3.8   GLOB   --    --    --   3.9   AGRAT   --    --    --   1.0      CBC w/Diff Recent Labs      10/15/17   0410  10/14/17   0450  10/13/17   0729   WBC  11.4  14.0*  21.7*   RBC  4.50*  4.50*  4.88   HGB  13.6  13.7  15.0   HCT  41.5  41.3  44.2   PLT  209  201  229   GRANS  62  72  84*   LYMPH  20*  13*  7*   EOS  6*  3  2      Cardiac Enzymes No results for input(s): CPK, CKND1, DANE in the last 72 hours. No lab exists for component: CKRMB, TROIP   Coagulation No results for input(s): PTP, INR, APTT in the last 72 hours. No lab exists for component: INREXT    Lipid Panel No results found for: CHOL, CHOLPOCT, CHOLX, CHLST, CHOLV, 528705, HDL, LDL, LDLC, DLDLP, 121161, VLDLC, VLDL, TGLX, TRIGL, TRIGP, TGLPOCT, CHHD, CHHDX   BNP No results for input(s): BNPP in the last 72 hours.    Liver Enzymes Recent Labs      10/13/17   0143   TP  7.7   ALB  3.8   AP  141*   SGOT  16      Thyroid Studies No results found for: T4, T3U, TSH, TSHEXT       Consults: None    Treatment Team: Treatment Team: Attending Provider: Maldonado Dwyer MD    Significant Diagnostic Studies: labs:   Recent Results (from the past 24 hour(s))   GLUCOSE, POC    Collection Time: 10/14/17 11:47 AM   Result Value Ref Range    Glucose (POC) 283 (H) 70 - 110 mg/dL   VANCOMYCIN, TROUGH    Collection Time: 10/14/17  3:04 PM   Result Value Ref Range    Vancomycin,trough 21.2 (HH) 10.0 - 20.0 ug/mL    Reported dose date: 20171014      Reported dose time: 800      Reported dose: 2G IV Q8H UNITS   GLUCOSE, POC    Collection Time: 10/14/17  5:29 PM   Result Value Ref Range    Glucose (POC) 202 (H) 70 - 110 mg/dL   GLUCOSE, POC    Collection Time: 10/14/17  9:09 PM   Result Value Ref Range    Glucose (POC) 251 (H) 70 - 110 mg/dL   GLUCOSE, POC    Collection Time: 10/14/17 10:49 PM   Result Value Ref Range    Glucose (POC) 269 (H) 70 - 425 mg/dL   METABOLIC PANEL, BASIC    Collection Time: 10/15/17  4:10 AM   Result Value Ref Range    Sodium 140 136 - 145 mmol/L    Potassium 4.1 3.5 - 5.5 mmol/L    Chloride 106 100 - 108 mmol/L    CO2 28 21 - 32 mmol/L    Anion gap 6 3.0 - 18 mmol/L    Glucose 213 (H) 74 - 99 mg/dL    BUN 9 7.0 - 18 MG/DL    Creatinine 0.73 0.6 - 1.3 MG/DL    BUN/Creatinine ratio 12 12 - 20      GFR est AA >60 >60 ml/min/1.73m2    GFR est non-AA >60 >60 ml/min/1.73m2    Calcium 8.3 (L) 8.5 - 10.1 MG/DL   CBC WITH AUTOMATED DIFF    Collection Time: 10/15/17  4:10 AM   Result Value Ref Range    WBC 11.4 4.6 - 13.2 K/uL    RBC 4.50 (L) 4.70 - 5.50 M/uL    HGB 13.6 13.0 - 16.0 g/dL    HCT 41.5 36.0 - 48.0 %    MCV 92.2 74.0 - 97.0 FL    MCH 30.2 24.0 - 34.0 PG    MCHC 32.8 31.0 - 37.0 g/dL    RDW 12.6 11.6 - 14.5 %    PLATELET 117 744 - 496 K/uL    MPV 11.0 9.2 - 11.8 FL    NEUTROPHILS 62 40 - 73 %    LYMPHOCYTES 20 (L) 21 - 52 %    MONOCYTES 12 (H) 3 - 10 %    EOSINOPHILS 6 (H) 0 - 5 %    BASOPHILS 0 0 - 2 %    ABS. NEUTROPHILS 7.1 1.8 - 8.0 K/UL    ABS. LYMPHOCYTES 2.2 0.9 - 3.6 K/UL    ABS. MONOCYTES 1.4 (H) 0.05 - 1.2 K/UL    ABS. EOSINOPHILS 0.7 (H) 0.0 - 0.4 K/UL    ABS.  BASOPHILS 0.0 0.0 - 0.06 K/UL    DF AUTOMATED     GLUCOSE, POC    Collection Time: 10/15/17  6:30 AM   Result Value Ref Range    Glucose (POC) 193 (H) 70 - 110 mg/dL   GLUCOSE, POC    Collection Time: 10/15/17  7:49 AM   Result Value Ref Range    Glucose (POC) 209 (H) 70 - 110 mg/dL         Discharge diagnoses:    Problem List as of 10/15/2017  Date Reviewed: 1/12/2012          Codes Class Noted - Resolved    * (Principal)Cellulitis of leg, left ICD-10-CM: L03.116  ICD-9-CM: 682.6  10/13/2017 - Present        Morbid obesity with BMI of 60.0-69.9, adult ([facility] ICD-10-CM: E66.01, Z68.44  ICD-9-CM: 278.01, V85.44  10/13/2017 - Present        DM (diabetes mellitus) ([facility] ICD-10-CM: E11.9  ICD-9-CM: 250.00  10/13/2017 - Present        Hyperglycemia ICD-10-CM: R73.9  ICD-9-CM: 790.29  10/13/2017 - Present        Nausea and vomiting ICD-10-CM: R11.2  ICD-9-CM: 787.01  10/13/2017 - Present        Pain in right ankle ICD-10-CM: M25.571  ICD-9-CM: 719.47  1/12/2012 - Present        Pain in left ankle ICD-10-CM: M25.572  ICD-9-CM: 719.47  1/12/2012 - Present        Extreme obesity (HCC) ICD-10-CM: E66.01  ICD-9-CM: 278.01  1/12/2012 - Present        Morbid obesity ([address] ICD-10-CM: E66.01  ICD-9-CM: 278.01  8/24/2011 - Present        HTN (hypertension) ICD-10-CM: I10  ICD-9-CM: 401.9  8/24/2011 - Present            Hospital Course:   Major issues addressed during hospitalization outlined  below. Dk Wei III is a 41 y.o. male who presents with nausea and vomiting for 3 days. He also c/o bilateral flank pain and neck pain. No fever , no chills. He denies abdominal pain and dysuria. Patient also has left leg pain and redness. He thinks it is his cellulitis. His blood sugar was 355 with A1c 12. 3. He also had lactic acidemia 2.7 The ED started him on IV rocephin and Vancomycin. He will be admitted for tx of his cellulitis and blood glucose control. Patient was started on rocephin and vanc and continued through hospitalization . Blood cultures negative. Will switch to bactrim at [place] today,  Pain much improved, redness improving. 10 day course ordered. PCP f/u in 1 week.     Kevin Esparza MD  October 15, 2017        Total time spent 35 minutes

## 2017-10-15 NOTE — PROGRESS NOTES
Patient received at beginning of shift from [facility], A&Ox4, in bed, awake. Pt does not appear to be in distress and denies any pain and/or discomfort. Safety measures take include bed in lowest locked position, call bell within reach, and personal items at bedside within reach. Pt verbalizes understanding of use of call bell and the need to call for assistance to ensure safety. 1025: Discharge orders placed by Dr. Blas Galloway. Patient does not have a ride until 1700; Dr. Blas Galloway gave the okay to hold on to him until 1700.

## 2017-10-19 LAB
BACTERIA SPEC CULT: NORMAL
BACTERIA SPEC CULT: NORMAL
SERVICE CMNT-IMP: NORMAL
SERVICE CMNT-IMP: NORMAL

## 2018-02-20 ENCOUNTER — HOSPITAL ENCOUNTER (EMERGENCY)
Age: 48
Discharge: HOME | End: 2018-02-21
Attending: EMERGENCY MEDICINE
Payer: MEDICARE

## 2018-02-20 ENCOUNTER — APPOINTMENT (OUTPATIENT)
Dept: GENERAL RADIOLOGY | Age: 48
End: 2018-02-20
Attending: PHYSICIAN ASSISTANT
Payer: MEDICARE

## 2018-02-20 DIAGNOSIS — R19.7 NAUSEA VOMITING AND DIARRHEA: Primary | ICD-10-CM

## 2018-02-20 DIAGNOSIS — E11.65 CONTROLLED TYPE 2 DIABETES MELLITUS WITH HYPERGLYCEMIA, WITH LONG-TERM CURRENT USE OF INSULIN (HCC): ICD-10-CM

## 2018-02-20 DIAGNOSIS — R11.2 NAUSEA VOMITING AND DIARRHEA: Primary | ICD-10-CM

## 2018-02-20 DIAGNOSIS — R06.02 SOB (SHORTNESS OF BREATH): ICD-10-CM

## 2018-02-20 DIAGNOSIS — R07.9 CHEST PAIN, UNSPECIFIED TYPE: ICD-10-CM

## 2018-02-20 DIAGNOSIS — Z79.4 CONTROLLED TYPE 2 DIABETES MELLITUS WITH HYPERGLYCEMIA, WITH LONG-TERM CURRENT USE OF INSULIN (HCC): ICD-10-CM

## 2018-02-20 LAB
ALBUMIN SERPL-MCNC: 3.7 G/DL (ref 3.4–5)
ALBUMIN/GLOB SERPL: 0.9 {RATIO} (ref 0.8–1.7)
ALP SERPL-CCNC: 139 U/L (ref 45–117)
ALT SERPL-CCNC: 33 U/L (ref 16–61)
ANION GAP SERPL CALC-SCNC: 9 MMOL/L (ref 3–18)
APPEARANCE UR: CLEAR
AST SERPL-CCNC: 16 U/L (ref 15–37)
BACTERIA URNS QL MICRO: ABNORMAL /HPF
BASOPHILS # BLD: 0 K/UL (ref 0–0.06)
BASOPHILS NFR BLD: 0 % (ref 0–2)
BILIRUB SERPL-MCNC: 1.1 MG/DL (ref 0.2–1)
BILIRUB UR QL: NEGATIVE
BNP SERPL-MCNC: 22 PG/ML (ref 0–450)
BUN SERPL-MCNC: 14 MG/DL (ref 7–18)
BUN/CREAT SERPL: 10 (ref 12–20)
CALCIUM SERPL-MCNC: 9.3 MG/DL (ref 8.5–10.1)
CHLORIDE SERPL-SCNC: 97 MMOL/L (ref 100–108)
CK MB CFR SERPL CALC: 0.9 % (ref 0–4)
CK MB SERPL-MCNC: 1.2 NG/ML (ref 5–25)
CK SERPL-CCNC: 136 U/L (ref 39–308)
CO2 SERPL-SCNC: 27 MMOL/L (ref 21–32)
COLOR UR: YELLOW
CREAT SERPL-MCNC: 1.36 MG/DL (ref 0.6–1.3)
DIFFERENTIAL METHOD BLD: ABNORMAL
EOSINOPHIL # BLD: 0.2 K/UL (ref 0–0.4)
EOSINOPHIL NFR BLD: 2 % (ref 0–5)
EPITH CASTS URNS QL MICRO: ABNORMAL /LPF (ref 0–5)
ERYTHROCYTE [DISTWIDTH] IN BLOOD BY AUTOMATED COUNT: 12.3 % (ref 11.6–14.5)
GLOBULIN SER CALC-MCNC: 4.3 G/DL (ref 2–4)
GLUCOSE BLD STRIP.AUTO-MCNC: 373 MG/DL (ref 70–110)
GLUCOSE BLD STRIP.AUTO-MCNC: 414 MG/DL (ref 70–110)
GLUCOSE SERPL-MCNC: 463 MG/DL (ref 74–99)
GLUCOSE UR STRIP.AUTO-MCNC: >1000 MG/DL
HCT VFR BLD AUTO: 49.2 % (ref 36–48)
HGB BLD-MCNC: 17.2 G/DL (ref 13–16)
HGB UR QL STRIP: NEGATIVE
KETONES UR QL STRIP.AUTO: NEGATIVE MG/DL
LEUKOCYTE ESTERASE UR QL STRIP.AUTO: ABNORMAL
LYMPHOCYTES # BLD: 2.9 K/UL (ref 0.9–3.6)
LYMPHOCYTES NFR BLD: 25 % (ref 21–52)
MCH RBC QN AUTO: 31 PG (ref 24–34)
MCHC RBC AUTO-ENTMCNC: 35 G/DL (ref 31–37)
MCV RBC AUTO: 88.6 FL (ref 74–97)
MONOCYTES # BLD: 0.8 K/UL (ref 0.05–1.2)
MONOCYTES NFR BLD: 7 % (ref 3–10)
NEUTS SEG # BLD: 7.3 K/UL (ref 1.8–8)
NEUTS SEG NFR BLD: 66 % (ref 40–73)
NITRITE UR QL STRIP.AUTO: NEGATIVE
PH UR STRIP: 5 [PH] (ref 5–8)
PLATELET # BLD AUTO: 313 K/UL (ref 135–420)
PMV BLD AUTO: 10.4 FL (ref 9.2–11.8)
POTASSIUM SERPL-SCNC: 4.4 MMOL/L (ref 3.5–5.5)
PROT SERPL-MCNC: 8 G/DL (ref 6.4–8.2)
PROT UR STRIP-MCNC: NEGATIVE MG/DL
RBC # BLD AUTO: 5.55 M/UL (ref 4.7–5.5)
RBC #/AREA URNS HPF: 0 /HPF (ref 0–5)
SODIUM SERPL-SCNC: 133 MMOL/L (ref 136–145)
SP GR UR REFRACTOMETRY: >1.03 (ref 1–1.03)
TROPONIN I SERPL-MCNC: <0.02 NG/ML (ref 0–0.04)
UROBILINOGEN UR QL STRIP.AUTO: 0.2 EU/DL (ref 0.2–1)
WBC # BLD AUTO: 11.3 K/UL (ref 4.6–13.2)
WBC URNS QL MICRO: ABNORMAL /HPF (ref 0–4)

## 2018-02-20 PROCEDURE — 96374 THER/PROPH/DIAG INJ IV PUSH: CPT

## 2018-02-20 PROCEDURE — 85025 COMPLETE CBC W/AUTO DIFF WBC: CPT | Performed by: PHYSICIAN ASSISTANT

## 2018-02-20 PROCEDURE — 82550 ASSAY OF CK (CPK): CPT | Performed by: PHYSICIAN ASSISTANT

## 2018-02-20 PROCEDURE — 80053 COMPREHEN METABOLIC PANEL: CPT | Performed by: PHYSICIAN ASSISTANT

## 2018-02-20 PROCEDURE — 74011636637 HC RX REV CODE- 636/637: Performed by: PHYSICIAN ASSISTANT

## 2018-02-20 PROCEDURE — 82962 GLUCOSE BLOOD TEST: CPT

## 2018-02-20 PROCEDURE — 81001 URINALYSIS AUTO W/SCOPE: CPT | Performed by: PHYSICIAN ASSISTANT

## 2018-02-20 PROCEDURE — 74011250636 HC RX REV CODE- 250/636: Performed by: PHYSICIAN ASSISTANT

## 2018-02-20 PROCEDURE — 83880 ASSAY OF NATRIURETIC PEPTIDE: CPT

## 2018-02-20 PROCEDURE — 96376 TX/PRO/DX INJ SAME DRUG ADON: CPT

## 2018-02-20 PROCEDURE — 96375 TX/PRO/DX INJ NEW DRUG ADDON: CPT

## 2018-02-20 PROCEDURE — 99284 EMERGENCY DEPT VISIT MOD MDM: CPT

## 2018-02-20 PROCEDURE — 96361 HYDRATE IV INFUSION ADD-ON: CPT

## 2018-02-20 PROCEDURE — 93005 ELECTROCARDIOGRAM TRACING: CPT

## 2018-02-20 PROCEDURE — 71046 X-RAY EXAM CHEST 2 VIEWS: CPT

## 2018-02-20 RX ORDER — ONDANSETRON 2 MG/ML
4 INJECTION INTRAMUSCULAR; INTRAVENOUS
Status: COMPLETED | OUTPATIENT
Start: 2018-02-20 | End: 2018-02-20

## 2018-02-20 RX ADMIN — INSULIN HUMAN 8 UNITS: 100 INJECTION, SOLUTION PARENTERAL at 22:03

## 2018-02-20 RX ADMIN — SODIUM CHLORIDE 1000 ML: 900 INJECTION, SOLUTION INTRAVENOUS at 22:03

## 2018-02-20 RX ADMIN — ONDANSETRON 4 MG: 2 INJECTION INTRAMUSCULAR; INTRAVENOUS at 20:43

## 2018-02-20 RX ADMIN — INSULIN HUMAN 8 UNITS: 100 INJECTION, SOLUTION PARENTERAL at 23:51

## 2018-02-20 NOTE — LETTER
[facility] EMERGENCY DEPT 
[facility] [address] [id number] 
[phone] Work/School Note Date: 2/20/2018 To Whom It May concern: 
 
Ashwin Wong III was seen and treated today in the emergency room by the following provider(s): 
Attending Provider: Greg Sheffield MD 
Physician Assistant: Giusepep Gutierrez PA-C. Ashwin Wong III may return to work on 02/23/2018. Sincerely, Giuseppe Gutierrez PA-C

## 2018-02-21 VITALS
BODY MASS INDEX: 38.36 KG/M2 | WEIGHT: 315 LBS | OXYGEN SATURATION: 96 % | HEART RATE: 91 BPM | TEMPERATURE: 97.6 F | SYSTOLIC BLOOD PRESSURE: 152 MMHG | RESPIRATION RATE: 16 BRPM | HEIGHT: 76 IN | DIASTOLIC BLOOD PRESSURE: 71 MMHG

## 2018-02-21 LAB
ATRIAL RATE: 85 BPM
CALCULATED P AXIS, ECG09: 52 DEGREES
CALCULATED R AXIS, ECG10: -18 DEGREES
CALCULATED T AXIS, ECG11: 13 DEGREES
CK MB CFR SERPL CALC: NORMAL % (ref 0–4)
CK MB SERPL-MCNC: <1 NG/ML (ref 5–25)
CK SERPL-CCNC: 110 U/L (ref 39–308)
DIAGNOSIS, 93000: NORMAL
GLUCOSE BLD STRIP.AUTO-MCNC: 303 MG/DL (ref 70–110)
P-R INTERVAL, ECG05: 186 MS
Q-T INTERVAL, ECG07: 340 MS
QRS DURATION, ECG06: 74 MS
QTC CALCULATION (BEZET), ECG08: 404 MS
TROPONIN I SERPL-MCNC: <0.02 NG/ML (ref 0–0.04)
VENTRICULAR RATE, ECG03: 85 BPM

## 2018-02-21 PROCEDURE — 74011636637 HC RX REV CODE- 636/637: Performed by: PHYSICIAN ASSISTANT

## 2018-02-21 PROCEDURE — 82962 GLUCOSE BLOOD TEST: CPT

## 2018-02-21 PROCEDURE — 82553 CREATINE MB FRACTION: CPT

## 2018-02-21 RX ORDER — ONDANSETRON 4 MG/1
4 TABLET, ORALLY DISINTEGRATING ORAL
Qty: 15 TAB | Refills: 0 | Status: SHIPPED | OUTPATIENT
Start: 2018-02-21

## 2018-02-21 RX ADMIN — INSULIN HUMAN 10 UNITS: 100 INJECTION, SOLUTION PARENTERAL at 00:59

## 2018-02-21 NOTE — ED NOTES
42 yo M with hx of HTN and DM who presents to the ED c/o n/v/d since Saturday. Notes intermittent chest pain and dyspnea since last night. I performed a brief evaluation, including history and physical, of the patient here in triage and I have determined that pt will need further treatment and evaluation from the main side ER physician. I have placed initial orders to help in expediting patients care.      February 20, 2018 at 7:08 PM - Cristal Bejarano

## 2018-02-21 NOTE — ED TRIAGE NOTES
Since Saturday , vomiiting and diarrhea. . Felt better today, went out to eat and started vomiting again. sob intermittent .

## 2018-02-21 NOTE — ED PROVIDER NOTES
Patient is a 41 y.o. male presenting with vomiting, diarrhea, shortness of breath, and chest pain. The history is provided by the patient. Vomiting    This is a new problem. Episode onset: 4 days ago. The problem occurs 2 to 4 times per day. The problem has not changed since onset. The emesis has an appearance of stomach contents. There has been no fever. Associated symptoms include diarrhea. Pertinent negatives include no chills, no fever, no sweats, no abdominal pain, no headaches, no arthralgias, no myalgias, no cough and no headaches. His past medical history is significant for DM. Diarrhea    This is a new problem. Episode onset: 4 days ago. The problem occurs daily. The problem has been gradually improving. The pain is associated with an unknown factor. The patient is experiencing no pain. Associated symptoms include diarrhea, nausea, vomiting and chest pain. Pertinent negatives include no anorexia, no fever, no belching, no flatus, no hematochezia, no melena, no constipation, no dysuria, no frequency, no hematuria, no headaches, no arthralgias, no myalgias, no trauma, no testicular pain and no back pain. The pain is worsened by eating. The pain is relieved by nothing. His past medical history is significant for DM. His past medical history does not include cancer. The patient's surgical history non-contributory. Shortness of Breath   This is a new problem. The average episode lasts 2 days. The problem occurs intermittently. The current episode started yesterday. The problem has not changed since onset. Associated symptoms include chest pain and vomiting. Pertinent negatives include no fever, no headaches, no coryza, no rhinorrhea, no sore throat, no swollen glands, no ear pain, no neck pain, no cough, no sputum production, no hemoptysis, no wheezing, no PND, no orthopnea, no syncope, no abdominal pain, no rash, no leg pain, no leg swelling and no claudication.  Risk factors: Denies recent long travel/immobolization/surgery, hormone use, hemoptysis, leg swelling, previous DVT or PE. He has tried nothing for the symptoms. Associated medical issues do not include PE, heart failure, past MI, DVT or recent surgery. Chest Pain (Angina)    This is a new problem. The current episode started yesterday. The problem has not changed since onset. Episode frequency: Intermittently. The pain is present in the substernal region. The pain is at a severity of 6/10. The quality of the pain is described as intermittent and dull. The pain does not radiate. The symptoms are aggravated by movement, palpation and certain positions. Associated symptoms include nausea, shortness of breath and vomiting. Pertinent negatives include no abdominal pain, no back pain, no claudication, no cough, no diaphoresis, no dizziness, no exertional chest pressure, no fever, no headaches, no hemoptysis, no irregular heartbeat, no leg pain, no malaise/fatigue, no near-syncope, no numbness, no orthopnea, no palpitations, no PND, no sputum production, no syncope and no weakness. He has tried nothing for the symptoms. Risk factors include dyslipidemia, diabetes mellitus, obesity and male gender. His past medical history is significant for DM and HTN. His past medical history does not include aneurysm, cancer, DVT, PE or CHF. Past Medical History:   Diagnosis Date    Diabetes ([address]     Hypercholesteremia     Hypertension        History reviewed. No pertinent surgical history. History reviewed. No pertinent family history. Social History     Social History    Marital status: SINGLE     Spouse name: N/A    Number of children: N/A    Years of education: N/A     Occupational History    Not on file.      Social History Main Topics    Smoking status: Former Smoker     Quit date: 1/1/1996    Smokeless tobacco: Never Used    Alcohol use No    Drug use: No    Sexual activity: Not on file     Other Topics Concern    Not on file Social History Narrative         ALLERGIES: Demerol [meperidine]    Review of Systems   Constitutional: Negative for chills, diaphoresis, fever and malaise/fatigue. HENT: Negative for ear pain, rhinorrhea and sore throat. Eyes: Negative for pain and redness. Respiratory: Positive for shortness of breath. Negative for cough, hemoptysis, sputum production, chest tightness and wheezing. Cardiovascular: Positive for chest pain. Negative for palpitations, orthopnea, claudication, leg swelling, syncope, PND and near-syncope. Gastrointestinal: Positive for diarrhea, nausea and vomiting. Negative for abdominal pain, anorexia, constipation, flatus, hematochezia and melena. Genitourinary: Negative for dysuria, frequency, hematuria and testicular pain. Musculoskeletal: Negative for arthralgias, back pain, joint swelling, myalgias and neck pain. Skin: Negative. Negative for rash. Neurological: Negative for dizziness, weakness, light-headedness, numbness and headaches. Psychiatric/Behavioral: Negative. All other systems reviewed and are negative. Vitals:    02/20/18 1907 02/20/18 2221   BP:  152/71   Pulse: 85 91   Resp: 16 16   Temp: 97.6 °F (36.4 °C)    SpO2: 96% 96%   Weight: (!) 265.8 kg (586 lb)    Height: 6' 4\" (1.93 m)             Physical Exam   Constitutional: He is oriented to person, place, and time. He appears well-developed and well-nourished. No distress. Morbidly obese   HENT:   Head: Normocephalic and atraumatic. Right Ear: Tympanic membrane, external ear and ear canal normal.   Left Ear: Tympanic membrane, external ear and ear canal normal.   Nose: Nose normal.   Mouth/Throat: Oropharynx is clear and moist and mucous membranes are normal. No oropharyngeal exudate. Eyes: Conjunctivae and EOM are normal. Pupils are equal, round, and reactive to light. Neck: Normal range of motion. Neck supple.    Cardiovascular: Normal rate, regular rhythm, normal heart sounds and intact distal pulses. Exam reveals no gallop and no friction rub. No murmur heard. Pulmonary/Chest: Effort normal. No accessory muscle usage. No tachypnea and no bradypnea. No respiratory distress. He has no decreased breath sounds. He has no wheezes. He has no rhonchi. He has no rales. He exhibits tenderness. Abdominal: Soft. Bowel sounds are normal. There is no tenderness. There is no rigidity, no rebound, no guarding, no CVA tenderness, no tenderness at McBurney's point and negative Martinez's sign. Musculoskeletal: Normal range of motion. He exhibits no edema or tenderness. Right lower leg: He exhibits no tenderness and no swelling. Left lower leg: He exhibits no tenderness and no swelling. Lymphadenopathy:     He has no cervical adenopathy. Neurological: He is alert and oriented to person, place, and time. Coordination normal.   Skin: Skin is warm and dry. No rash noted. He is not diaphoretic. Psychiatric: He has a normal mood and affect. His behavior is normal. Judgment and thought content normal.   Nursing note and vitals reviewed.        MDM  Number of Diagnoses or Management Options  Chest pain, unspecified type: new and requires workup  Controlled type 2 diabetes mellitus with hyperglycemia, with long-term current use of insulin ([facility] [address]): new and requires workup  Nausea vomiting and diarrhea: new and requires workup  SOB (shortness of breath): new and requires workup  Diagnosis management comments: DDx: atypical CP, costochondritis/chest wall pain, gastroenteritis, gastritis, pneumothorax, palpitations,  ischemia, HF, MI, TAA/AAA, pericarditis, trauma (cardiac contusion, rib Fx, etc), asthma exacerbation, PNE, bronchitis, viral vs bacterial URI, pleuritic chest pain, pleural effusion, intraabdominal process (pancreatitis, hepatitis, GERD, diverticulitis, mesenteric ischemia, etc)    Per PERC criteria, pt is low-risk by <48years of age, with a pulse <100 bpm, SaO2 here is 96%, no hemoptysis, no estrogen use, no history of surgery/trauma within 4 weeks, no prior PE/DVT and no present signs of DVT, so PE can be safely ruled out and does not require further workup for PE. Will check EKG, CXR, labs, tx symptoms, reassess. EKG NSR, two sets of cardiac enzymes negative. HEART score of 2, doubt cardiac etiology. CXR looks similar to 2017 CXR. BG improving with insulin. Will d/c to home with symptomatic tx, pt to f/u with PCP in 2 days, strict ED return precautions given. Pt results have been reviewed with the patient and any family present. They have been counseled regarding diagnosis, treatment, and plan. They verbally convey understanding and agreement of the signs, symptoms, diagnosis, treatment and prognosis and additionally agrees to follow up as discussed. They also agree with the care-plan and convey that all of their questions have been answered. I have also provided discharge instructions for them that include: educational information regarding their diagnosis and treatment, and list of reasons why they would want to return to the ED prior to their follow-up appointment, should their condition change. Tc Cespedes PA-C         Amount and/or Complexity of Data Reviewed  Clinical lab tests: ordered and reviewed  Tests in the radiology section of CPT®: ordered and reviewed  Tests in the medicine section of CPT®: ordered and reviewed  Review and summarize past medical records: yes  Discuss the patient with other providers: yes  Independent visualization of images, tracings, or specimens: yes    Risk of Complications, Morbidity, and/or Mortality  Presenting problems: moderate  Diagnostic procedures: moderate  Management options: moderate    Patient Progress  Patient progress: stable        ED Course       EKG  Date/Time: 2/20/2018 7:48 PM  Performed by: Marcelo Gong.   Authorized by: Marcelo Gong     ECG reviewed by ED Physician in the absence of a cardiologist: yes Interpretation:     Interpretation: non-specific    Rate:     ECG rate:  85    ECG rate assessment: normal    Rhythm:     Rhythm: sinus rhythm    Ectopy:     Ectopy: PVCs      PVCs:  Infrequent  QRS:     QRS intervals:  Normal  Conduction:     Conduction: normal        Labs Reviewed   CBC WITH AUTOMATED DIFF - Abnormal; Notable for the following:        Result Value    RBC 5.55 (*)     HGB 17.2 (*)     HCT 49.2 (*)     All other components within normal limits   METABOLIC PANEL, COMPREHENSIVE - Abnormal; Notable for the following:     Sodium 133 (*)     Chloride 97 (*)     Glucose 463 (*)     Creatinine 1.36 (*)     BUN/Creatinine ratio 10 (*)     GFR est non-AA 57 (*)     Bilirubin, total 1.1 (*)     Alk. phosphatase 139 (*)     Globulin 4.3 (*)     All other components within normal limits   URINALYSIS W/ RFLX MICROSCOPIC - Abnormal; Notable for the following:     Specific gravity >1.030 (*)     Glucose >1000 (*)     Leukocyte Esterase SMALL (*)     All other components within normal limits   URINE MICROSCOPIC ONLY - Abnormal; Notable for the following:     Bacteria FEW (*)     All other components within normal limits   GLUCOSE, POC - Abnormal; Notable for the following:     Glucose (POC) 414 (*)     All other components within normal limits   GLUCOSE, POC - Abnormal; Notable for the following:     Glucose (POC) 373 (*)     All other components within normal limits   CARDIAC PANEL,(CK, CKMB & TROPONIN)   NT-PRO BNP   CARDIAC PANEL,(CK, CKMB & TROPONIN)     Diagnosis:   1. Nausea vomiting and diarrhea    2. Controlled type 2 diabetes mellitus with hyperglycemia, with long-term current use of insulin ([address]    3. SOB (shortness of breath)    4. Chest pain, unspecified type          Disposition: Discharge to home.      Follow-up Information     Follow up With Details Comments Contact Info    [facility] EMERGENCY DEPT  As needed, If symptoms worsen [address] [place] [address] Go in 2 days  [address] [facility] [address] [phone]            Patient's Medications   Start Taking    ONDANSETRON (ZOFRAN ODT) 4 MG DISINTEGRATING TABLET    Take 1 Tab by mouth every eight (8) hours as needed for Nausea. Continue Taking    ALBUTEROL (PROAIR HFA) 90 MCG/ACTUATION INHALER    Take  by inhalation. EZETIMIBE-SIMVASTATIN (VYTORIN 10/20) 10-20 MG PER TABLET    Take 1 Tab by mouth nightly. FLUOXETINE (PROZAC) 40 MG CAPSULE    Take 40 mg by mouth daily. INSULIN GLARGINE (LANTUS SOLOSTAR) 100 UNIT/ML (3 ML) INPN    46 Units by SubCUTAneous route. MULTIVITAMIN PO    Take  by mouth. NEBIVOLOL (BYSTOLIC) 10 MG TABLET    Take 10 mg by mouth daily. OLMESARTAN-HYDROCHLOROTHIAZIDE (BENICAR HCT) 40-25 MG PER TABLET    Take 1 Tab by mouth daily. OXYCODONE-ACETAMINOPHEN (PERCOCET) 5-325 MG PER TABLET    Take 1 Tab by mouth every four (4) hours as needed for Pain. Max Daily Amount: 6 Tabs. TRIMETHOPRIM-SULFAMETHOXAZOLE (BACTRIM DS) 160-800 MG PER TABLET    Take 1 Tab by mouth two (2) times a day. VENLAFAXINE-SR (EFFEXOR XR) 150 MG CAPSULE    Take 150 mg by mouth daily.    These Medications have changed    No medications on file   Stop Taking    No medications on file